# Patient Record
Sex: FEMALE | Employment: PART TIME | URBAN - METROPOLITAN AREA
[De-identification: names, ages, dates, MRNs, and addresses within clinical notes are randomized per-mention and may not be internally consistent; named-entity substitution may affect disease eponyms.]

---

## 2020-03-11 ENCOUNTER — OFFICE VISIT (OUTPATIENT)
Dept: URGENT CARE | Facility: CLINIC | Age: 50
End: 2020-03-11

## 2020-03-11 ENCOUNTER — HOSPITAL ENCOUNTER (OUTPATIENT)
Facility: MEDICAL CENTER | Age: 50
End: 2020-03-11
Attending: NURSE PRACTITIONER
Payer: COMMERCIAL

## 2020-03-11 ENCOUNTER — NON-PROVIDER VISIT (OUTPATIENT)
Dept: URGENT CARE | Facility: CLINIC | Age: 50
End: 2020-03-11

## 2020-03-11 VITALS
HEIGHT: 66 IN | BODY MASS INDEX: 27.48 KG/M2 | WEIGHT: 171 LBS | HEART RATE: 68 BPM | RESPIRATION RATE: 14 BRPM | TEMPERATURE: 97.8 F

## 2020-03-11 VITALS
OXYGEN SATURATION: 94 % | BODY MASS INDEX: 27.64 KG/M2 | RESPIRATION RATE: 14 BRPM | WEIGHT: 171.96 LBS | HEIGHT: 66 IN | HEART RATE: 68 BPM | TEMPERATURE: 97.8 F

## 2020-03-11 DIAGNOSIS — Z02.89 ENCOUNTER FOR OCCUPATIONAL HEALTH EXAMINATION: Primary | ICD-10-CM

## 2020-03-11 DIAGNOSIS — Z02.89 ENCOUNTER FOR OCCUPATIONAL HEALTH EXAMINATION: ICD-10-CM

## 2020-03-11 DIAGNOSIS — Z02.1 PRE-EMPLOYMENT DRUG SCREENING: ICD-10-CM

## 2020-03-11 LAB
AMP AMPHETAMINE: NORMAL
BAR BARBITURATES: NORMAL
BZO BENZODIAZEPINES: NORMAL
COC COCAINE: NORMAL
INT CON NEG: NORMAL
INT CON POS: NORMAL
MDMA ECSTASY: NORMAL
MET METHAMPHETAMINES: NORMAL
MTD METHADONE: NORMAL
OPI OPIATES: NORMAL
OXY OXYCODONE: NORMAL
PCP PHENCYCLIDINE: NORMAL
POC URINE DRUG SCREEN OCDRS: NORMAL
THC: NORMAL

## 2020-03-11 PROCEDURE — 80305 DRUG TEST PRSMV DIR OPT OBS: CPT | Mod: QW | Performed by: NURSE PRACTITIONER

## 2020-03-11 PROCEDURE — 86765 RUBEOLA ANTIBODY: CPT | Performed by: NURSE PRACTITIONER

## 2020-03-11 PROCEDURE — 86762 RUBELLA ANTIBODY: CPT | Performed by: NURSE PRACTITIONER

## 2020-03-11 PROCEDURE — 8915 PR COMPREHENSIVE PHYSICAL: Performed by: PREVENTIVE MEDICINE

## 2020-03-11 PROCEDURE — 86735 MUMPS ANTIBODY: CPT | Performed by: NURSE PRACTITIONER

## 2020-03-11 PROCEDURE — 86480 TB TEST CELL IMMUN MEASURE: CPT | Performed by: NURSE PRACTITIONER

## 2020-03-11 PROCEDURE — 90715 TDAP VACCINE 7 YRS/> IM: CPT | Performed by: NURSE PRACTITIONER

## 2020-03-11 PROCEDURE — 86787 VARICELLA-ZOSTER ANTIBODY: CPT | Performed by: NURSE PRACTITIONER

## 2020-03-11 PROCEDURE — 90686 IIV4 VACC NO PRSV 0.5 ML IM: CPT | Performed by: NURSE PRACTITIONER

## 2020-03-12 LAB
MEV IGG SER IA-ACNC: 0.38
MUV IGG SER IA-ACNC: 1.31
RUBV AB SER QL: 58.6 IU/ML
VZV IGG SER IA-ACNC: 3.16

## 2020-03-13 LAB
GAMMA INTERFERON BACKGROUND BLD IA-ACNC: 0.02 IU/ML
M TB IFN-G BLD-IMP: NEGATIVE
M TB IFN-G CD4+ BCKGRND COR BLD-ACNC: 0 IU/ML
MITOGEN IGNF BCKGRD COR BLD-ACNC: 3.2 IU/ML
QFT TB2 - NIL TBQ2: 0 IU/ML

## 2020-03-20 ENCOUNTER — TELEPHONE (OUTPATIENT)
Dept: URGENT CARE | Facility: CLINIC | Age: 50
End: 2020-03-20

## 2020-03-20 NOTE — TELEPHONE ENCOUNTER
Phone Number Called: [phone] (home)       Call outcome: Spoke to patient regarding message below.    Message: MMR Vaccine required. Low titers.

## 2020-03-24 ENCOUNTER — NON-PROVIDER VISIT (OUTPATIENT)
Dept: URGENT CARE | Facility: CLINIC | Age: 50
End: 2020-03-24

## 2020-03-24 DIAGNOSIS — Z23 NEED FOR VACCINATION: ICD-10-CM

## 2020-03-24 PROCEDURE — 90707 MMR VACCINE SC: CPT | Performed by: NURSE PRACTITIONER

## 2020-03-27 ENCOUNTER — EH NON-PROVIDER (OUTPATIENT)
Dept: OCCUPATIONAL MEDICINE | Facility: CLINIC | Age: 50
End: 2020-03-27

## 2020-03-27 DIAGNOSIS — Z71.85 IMMUNIZATION COUNSELING: ICD-10-CM

## 2020-04-24 ENCOUNTER — NON-PROVIDER VISIT (OUTPATIENT)
Dept: URGENT CARE | Facility: CLINIC | Age: 50
End: 2020-04-24

## 2020-04-24 DIAGNOSIS — Z23 NEED FOR VACCINATION: Primary | ICD-10-CM

## 2020-04-24 PROCEDURE — 90707 MMR VACCINE SC: CPT | Performed by: PREVENTIVE MEDICINE

## 2020-07-07 ENCOUNTER — HOSPITAL ENCOUNTER (OUTPATIENT)
Facility: MEDICAL CENTER | Age: 50
End: 2020-07-07
Attending: NURSE PRACTITIONER

## 2020-07-07 ENCOUNTER — OFFICE VISIT (OUTPATIENT)
Dept: MEDICAL GROUP | Facility: PHYSICIAN GROUP | Age: 50
End: 2020-07-07

## 2020-07-07 VITALS
BODY MASS INDEX: 27.16 KG/M2 | SYSTOLIC BLOOD PRESSURE: 130 MMHG | HEIGHT: 66 IN | TEMPERATURE: 98.6 F | OXYGEN SATURATION: 98 % | WEIGHT: 169 LBS | HEART RATE: 86 BPM | DIASTOLIC BLOOD PRESSURE: 82 MMHG

## 2020-07-07 DIAGNOSIS — Z86.19 HISTORY OF CLOSTRIDIOIDES DIFFICILE INFECTION: ICD-10-CM

## 2020-07-07 DIAGNOSIS — F41.9 ANXIETY: ICD-10-CM

## 2020-07-07 DIAGNOSIS — F33.41 RECURRENT MAJOR DEPRESSIVE DISORDER, IN PARTIAL REMISSION (HCC): ICD-10-CM

## 2020-07-07 DIAGNOSIS — G47.00 INSOMNIA, UNSPECIFIED TYPE: ICD-10-CM

## 2020-07-07 DIAGNOSIS — Z79.899 CHRONIC PRESCRIPTION BENZODIAZEPINE USE: ICD-10-CM

## 2020-07-07 PROCEDURE — 80307 DRUG TEST PRSMV CHEM ANLYZR: CPT

## 2020-07-07 PROCEDURE — 99214 OFFICE O/P EST MOD 30 MIN: CPT | Performed by: NURSE PRACTITIONER

## 2020-07-07 RX ORDER — ZOLPIDEM TARTRATE 12.5 MG/1
12.5 TABLET, FILM COATED, EXTENDED RELEASE ORAL NIGHTLY PRN
COMMUNITY
End: 2020-07-07 | Stop reason: SDUPTHER

## 2020-07-07 RX ORDER — ALPRAZOLAM 0.5 MG/1
0.5 TABLET ORAL 2 TIMES DAILY PRN
Qty: 30 TAB | Refills: 2 | Status: SHIPPED | OUTPATIENT
Start: 2020-07-07 | End: 2020-10-08 | Stop reason: SDUPTHER

## 2020-07-07 RX ORDER — ALPRAZOLAM 0.5 MG/1
0.5 TABLET ORAL NIGHTLY PRN
COMMUNITY
End: 2020-07-07 | Stop reason: SDUPTHER

## 2020-07-07 RX ORDER — ZOLPIDEM TARTRATE 12.5 MG/1
12.5 TABLET, FILM COATED, EXTENDED RELEASE ORAL NIGHTLY PRN
Qty: 30 TAB | Refills: 2 | Status: SHIPPED | OUTPATIENT
Start: 2020-07-07 | End: 2020-10-08 | Stop reason: SDUPTHER

## 2020-07-07 SDOH — HEALTH STABILITY: MENTAL HEALTH: HOW MANY STANDARD DRINKS CONTAINING ALCOHOL DO YOU HAVE ON A TYPICAL DAY?: 1 OR 2

## 2020-07-07 SDOH — HEALTH STABILITY: MENTAL HEALTH: HOW OFTEN DO YOU HAVE 6 OR MORE DRINKS ON ONE OCCASION?: NEVER

## 2020-07-07 SDOH — HEALTH STABILITY: MENTAL HEALTH: HOW OFTEN DO YOU HAVE A DRINK CONTAINING ALCOHOL?: MONTHLY OR LESS

## 2020-07-07 ASSESSMENT — PATIENT HEALTH QUESTIONNAIRE - PHQ9
5. POOR APPETITE OR OVEREATING: 1 - SEVERAL DAYS
SUM OF ALL RESPONSES TO PHQ QUESTIONS 1-9: 10
CLINICAL INTERPRETATION OF PHQ2 SCORE: 3

## 2020-07-07 NOTE — ASSESSMENT & PLAN NOTE
Patient is a 50-year-old female here to establish care with me today.  Reports anxiety and depression has been a chronic intermittent health problem for many years.  Has been taking sertraline 50 mg daily for last 2 years.  Has been running out of medication, so has been taking it every 2 to 3 days for the last 6 weeks.  Has noticed worsening anxiety and depression.  Patient also takes alprazolam 0.5 mg half a tab at a time, usually once every couple days.  Has been needing to take more since has been running out of sertraline.  Started to need alprazolam about a year ago after her son was in a MVA where his girlfriend was killed.  Patient has had a lot of stressors.  Moved to the area from [place] 5 months ago just before COVID pandemic.  Patient and her  had jobs lined up, but never started due to the pandemic.  They have recently found new jobs in the last few weeks.  Denies SI/HI.

## 2020-07-08 NOTE — ASSESSMENT & PLAN NOTE
Patient is a 50-year-old female here to establish care with me today.  Reports history of C. difficile a year and a half ago after 2 bouts of antibiotics for ear infection and tooth infection.  Had to have fecal transplant.  Denies any concerns.

## 2020-07-08 NOTE — PROGRESS NOTES
CC: Establish care, medication refills.    HISTORY OF THE PRESENT ILLNESS: Patient is a 50 y.o. female. This pleasant patient is here today establish care and for evaluation and management of the following health problems.  Patient recently moved to the area from [facility].  She brings in pill bottles that she is needing refills of.      Recurrent major depressive disorder, in partial remission (HCC)  Patient is a 50-year-old female here to establish care with me today.  Reports anxiety and depression has been a chronic intermittent health problem for many years.  Has been taking sertraline 50 mg daily for last 2 years.  Has been running out of medication, so has been taking it every 2 to 3 days for the last 6 weeks.  Has noticed worsening anxiety and depression.  Patient also takes alprazolam 0.5 mg half a tab at a time, usually once every couple days.  Has been needing to take more since has been running out of sertraline.  Started to need alprazolam about a year ago after her son was in a MVA where his girlfriend was killed.  Patient has had a lot of stressors.  Moved to the area from [place] 5 months ago just before COVID pandemic.  Patient and her  had jobs lined up, but never started due to the pandemic.  They have recently found new jobs in the last few weeks.  Denies SI/HI.    Insomnia  Patient is a 50-year-old female here to establish care with me today.  Patient reports this is been a chronic problem for a couple years.  Is usually well controlled with zolpidem CR 12.5 mg.  Helps keep patient asleep.  Denies sleepwalking or sleep eating.  Feels rested the next day.  Patient will be out of medication soon.  Has been using sparingly.  Reports on nights that she does not take it, she takes over-the-counter sleep aid which does not help.  Has recently started a new job as a  at [employer].  Needing to get good night sleep.    History of Clostridioides difficile  infection  Patient is a 50-year-old female here to establish care with me today.  Reports history of C. difficile a year and a half ago after 2 bouts of antibiotics for ear infection and tooth infection.  Had to have fecal transplant.  Denies any concerns.      Allergies: Sulfa drugs    Current Outpatient Medications Ordered in Epic   Medication Sig Dispense Refill   • Estradiol (DIVIGEL TD) Apply  to skin as directed.     • sertraline (ZOLOFT) 50 MG Tab Take 1 tab daily once a day for 2 weeks, then increase to 2 tabs once a day thereafter. 60 Tab 2   • zolpidem (AMBIEN CR) 12.5 MG CR tablet Take 1 Tab by mouth at bedtime as needed for Sleep for up to 90 days. 30 Tab 2   • ALPRAZolam (XANAX) 0.5 MG Tab Take 1 Tab by mouth 2 times a day as needed for Anxiety for up to 90 days. 30 Tab 2     No current Epic-ordered facility-administered medications on file.        Past Medical History:   Diagnosis Date   • Allergy    • Anxiety    • Depression        Past Surgical History:   Procedure Laterality Date   • ABDOMINAL HYSTERECTOMY TOTAL      ovaries removed   • LAPAROSCOPY     • PRIMARY C SECTION         Social History     Tobacco Use   • Smoking status: Former Smoker     Years: 2.00     Types: Cigarettes     Last attempt to quit:      Years since quittin.5   • Smokeless tobacco: Never Used   Substance Use Topics   • Alcohol use: Yes     Frequency: Monthly or less     Drinks per session: 1 or 2     Binge frequency: Never     Comment: Occ   • Drug use: Never       Family History   Problem Relation Age of Onset   • Arthritis Mother    • Cancer Father         bone cancer as child   • No Known Problems Sister    • No Known Problems Brother    • Heart Failure Maternal Grandmother        ROS:     - Constitutional: Negative for fever, chills, unexpected weight change, generalized weakness.  Positive fatigue.    - HEENT: Negative for headaches, vision changes, hearing changes, ear pain, rhinorrhea, sinus congestion, and  "sore throat.      - Respiratory: Negative for cough, dyspnea, and wheezing.      - Cardiovascular: Negative for chest pain, orthopnea, and bilateral lower extremity edema.  Positive palpitations when anxious.    - Gastrointestinal: Negative for heartburn, nausea, vomiting, abdominal pain, hematochezia, melena, diarrhea, constipation.     - Genitourinary: Negative for dysuria, polyuria, hematuria, pyuria, urinary urgency, and urinary incontinence.    - Musculoskeletal: Negative for myalgias, back pain, and joint pain.     - Skin: Negative for rash, itching, cyanotic skin color change.     - Neurological: Negative for tingling, tremors, focal sensory deficit, focal weakness and headaches.  Positive occasional dizziness.    - Endo/Heme/Allergies: Does not bruise/bleed easily.     - Psychiatric/Behavioral: As in HPI, otherwise negative for suicidal/homicidal ideation and memory loss.           Exam: /82   Pulse 86   Temp 37 °C (98.6 °F) (Temporal)   Ht 1.676 m (5' 6\")   Wt 76.7 kg (169 lb)   SpO2 98%  Body mass index is 27.28 kg/m².    General: Alert, pleasant, well nourished, well developed female in NAD  HEENT: Normocephalic. Eyes conjunctiva clear lids without ptosis, pupils equal and reactive to light, ears normal shape and contour, canals are clear bilaterally, tympanic membranes are pearly gray with good light reflex, nasal mucosa without erythema and drainage, oropharynx is without erythema, edema or exudates.   Neck: Supple without bruit. Thyroid is not enlarged.  Pulmonary: Clear to ausculation.  Normal effort. No rales, ronchi, or wheezing.  Cardiovascular: Normal rate and rhythm without murmur. Carotid and radial pulses are intact and equal bilaterally.  No lower extremity edema.  Abdomen: Soft, nontender, nondistended. Normal bowel sounds. Liver and spleen are not palpable  Neurologic: Grossly nonfocal  Lymph: No cervical or supraclavicular lymph nodes are palpable  Skin: Warm and dry.  No obvious " lesions.  Musculoskeletal: Normal gait.   Psych: Normal mood and affect.  Tearful when talking about son's motor vehicle accident.  Alert and oriented. Judgment and insight is normal.    Please note that this dictation was created using voice recognition software. I have made every reasonable attempt to correct obvious errors, but I expect that there are errors of grammar and possibly content that I did not discover before finalizing the note.      Assessment/Plan  1. Anxiety  Patient having worsening anxiety and depression.  Has only been taking Zoloft every other day for 6 weeks.  Will start Zoloft 50 mg daily for 2 weeks and increase to 100 mg thereafter.  Patient using alprazolam sparingly.   shows prescription for 60 tabs filled on 3/26/2020.  Reviewed risks of benzodiazepines including respiratory depression and that risks are increased with alcohol use.  Patient instructed not to take within 4 hours of taking Ambien.  Reviewed lifestyle measures including routine aerobic exercise as tolerated.  Will get controlled substance agreement and consent in addition to UDS.  Will get previous primary care providers chart notes.  - sertraline (ZOLOFT) 50 MG Tab; Take 1 tab daily once a day for 2 weeks, then increase to 2 tabs once a day thereafter.  Dispense: 60 Tab; Refill: 2  - ALPRAZolam (XANAX) 0.5 MG Tab; Take 1 Tab by mouth 2 times a day as needed for Anxiety for up to 90 days.  Dispense: 30 Tab; Refill: 2    2. Recurrent major depressive disorder, in partial remission (HCC)  As above, under anxiety.  - sertraline (ZOLOFT) 50 MG Tab; Take 1 tab daily once a day for 2 weeks, then increase to 2 tabs once a day thereafter.  Dispense: 60 Tab; Refill: 2    3. Chronic prescription benzodiazepine use  Patient understands that refills for controlled substances will be done at appointment only.  This patient is continuing to use a controlled substance (CS) on a long term basis.  The patient is thoroughly aware of the  goals of treatment with the CS  The patient is aware that yearly and random urine drug screens are required.  The patient has been instructed to take the CS only as prescribed.  The patient is prohibited from sharing the CS with any other person.  The patient is instructed to inform the provider if any other CS is taken, of any alcohol or cannabis or other recreational drug use, any treatment for side effects of the CS or complications, if they have CS active rx in other states  The patient has evidence for a reason for the CS  The treatment plan has been discussed with the patient  The  report has been reviewed      - Controlled Substance Treatment Agreement  - Consent for Opiate Prescription  - URINE DRUG SCREEN; Future    4. Insomnia, unspecified type  Patient brings in empty pill bottle today.  Requesting refill.   reviewed.  Will refill Ambien.  Did review good sleep hygiene.  - zolpidem (AMBIEN CR) 12.5 MG CR tablet; Take 1 Tab by mouth at bedtime as needed for Sleep for up to 90 days.  Dispense: 30 Tab; Refill: 2    5. History of Clostridioides difficile infection      Patient will return to clinic in 3 months for medication refill or sooner if needed.

## 2020-07-08 NOTE — ASSESSMENT & PLAN NOTE
Patient is a 50-year-old female here to establish care with me today.  Patient reports this is been a chronic problem for a couple years.  Is usually well controlled with zolpidem CR 12.5 mg.  Helps keep patient asleep.  Denies sleepwalking or sleep eating.  Feels rested the next day.  Patient will be out of medication soon.  Has been using sparingly.  Reports on nights that she does not take it, she takes over-the-counter sleep aid which does not help.  Has recently started a new job as a  at [employer].  Needing to get good night sleep.

## 2020-07-11 LAB
AMPHETAMINES UR QL: NEGATIVE NG/ML
BARBITURATES UR QL: NEGATIVE NG/ML
BENZODIAZ UR QL: NEGATIVE NG/ML
CANNABINOIDS UR QL SCN: NEGATIVE NG/ML
COCAINE UR QL: NEGATIVE NG/ML
DRUG SCREEN COMMENT UR-IMP: NORMAL
MDMA CTO UR SCN-MCNC: NEGATIVE NG/ML
METHADONE UR QL: NEGATIVE NG/ML
OPIATES UR QL: NEGATIVE NG/ML
OXYCODONE CTO UR SCN-MCNC: NEGATIVE NG/ML
PCP UR QL SCN: NEGATIVE NG/ML
PROPOXYPH UR QL: NEGATIVE NG/ML

## 2020-10-08 ENCOUNTER — OFFICE VISIT (OUTPATIENT)
Dept: MEDICAL GROUP | Facility: PHYSICIAN GROUP | Age: 50
End: 2020-10-08

## 2020-10-08 VITALS
TEMPERATURE: 97.5 F | SYSTOLIC BLOOD PRESSURE: 124 MMHG | DIASTOLIC BLOOD PRESSURE: 78 MMHG | BODY MASS INDEX: 27.32 KG/M2 | HEART RATE: 69 BPM | WEIGHT: 170 LBS | HEIGHT: 66 IN | OXYGEN SATURATION: 98 % | RESPIRATION RATE: 12 BRPM

## 2020-10-08 DIAGNOSIS — F41.9 ANXIETY: ICD-10-CM

## 2020-10-08 DIAGNOSIS — Z90.721 S/P HYSTERECTOMY WITH OOPHORECTOMY: ICD-10-CM

## 2020-10-08 DIAGNOSIS — F33.41 RECURRENT MAJOR DEPRESSIVE DISORDER, IN PARTIAL REMISSION (HCC): ICD-10-CM

## 2020-10-08 DIAGNOSIS — Z90.710 S/P HYSTERECTOMY WITH OOPHORECTOMY: ICD-10-CM

## 2020-10-08 DIAGNOSIS — G47.00 INSOMNIA, UNSPECIFIED TYPE: ICD-10-CM

## 2020-10-08 PROCEDURE — 99214 OFFICE O/P EST MOD 30 MIN: CPT | Performed by: NURSE PRACTITIONER

## 2020-10-08 RX ORDER — ZOLPIDEM TARTRATE 12.5 MG/1
12.5 TABLET, FILM COATED, EXTENDED RELEASE ORAL NIGHTLY PRN
Qty: 30 TAB | Refills: 2 | Status: SHIPPED | OUTPATIENT
Start: 2020-10-08 | End: 2021-01-06 | Stop reason: SDUPTHER

## 2020-10-08 RX ORDER — HYDROXYZINE HYDROCHLORIDE 25 MG/1
25 TABLET, FILM COATED ORAL 3 TIMES DAILY PRN
Qty: 60 TAB | Refills: 1 | Status: SHIPPED | OUTPATIENT
Start: 2020-10-08 | End: 2021-01-06

## 2020-10-08 RX ORDER — ALPRAZOLAM 0.5 MG/1
0.5 TABLET ORAL 2 TIMES DAILY PRN
Qty: 30 TAB | Refills: 2 | Status: SHIPPED | OUTPATIENT
Start: 2020-10-08 | End: 2021-01-06 | Stop reason: SDUPTHER

## 2020-10-08 NOTE — ASSESSMENT & PLAN NOTE
Patient is status post hysterectomy and oophorectomy in 2011 and 2016.  Has been on HRT since 2016.  Gynecology has been prescribing estradiol patch 0.1 mg every 7 days.  Patient is asking that I refill medication as her gynecologist is in [place] and patient has no longer under care.  Did discuss increase risk for breast cancer.

## 2020-10-08 NOTE — ASSESSMENT & PLAN NOTE
"Chronic health problem, improved control with sertraline 100 mg.  Patient is taking 50 mg twice a day.  She tried to take 100 mg once a day, but \"just felt off.\"  She reports great improvement in depression.  Still having anxiety.  Will need to take alprazolam half a tab twice a day.  Seems to take it in the morning routinely, then usually once a day for anxiety attack.  Reports wife has been less stressful.   now has a job as a , though he is away for weeks at a time.  He recently got his first paycheck.  She now has an established job at [email or web address].  She is still waiting to get health insurance as it is very expensive.  She does plan on getting health insurance before her next appointment with me.  At that time consider referral to psychiatry.    "

## 2020-10-08 NOTE — ASSESSMENT & PLAN NOTE
This is a chronic health problem that is well controlled with current medications and lifestyle measures.  Taking zolpidem CR 12.5 mg nightly.  Reports since restarting medication, has been sleeping much better.  Feels rested the next day.  Does not take within 4 hours of Xanax.  Requesting refill today.

## 2021-01-06 ENCOUNTER — OFFICE VISIT (OUTPATIENT)
Dept: MEDICAL GROUP | Facility: PHYSICIAN GROUP | Age: 51
End: 2021-01-06
Payer: COMMERCIAL

## 2021-01-06 VITALS
HEIGHT: 66 IN | DIASTOLIC BLOOD PRESSURE: 70 MMHG | BODY MASS INDEX: 27.48 KG/M2 | RESPIRATION RATE: 18 BRPM | HEART RATE: 73 BPM | OXYGEN SATURATION: 97 % | WEIGHT: 171 LBS | SYSTOLIC BLOOD PRESSURE: 110 MMHG | TEMPERATURE: 98.6 F

## 2021-01-06 DIAGNOSIS — G47.00 INSOMNIA, UNSPECIFIED TYPE: ICD-10-CM

## 2021-01-06 DIAGNOSIS — F33.41 RECURRENT MAJOR DEPRESSIVE DISORDER, IN PARTIAL REMISSION (HCC): ICD-10-CM

## 2021-01-06 DIAGNOSIS — Z13.29 SCREENING FOR THYROID DISORDER: ICD-10-CM

## 2021-01-06 DIAGNOSIS — Z90.721 S/P HYSTERECTOMY WITH OOPHORECTOMY: ICD-10-CM

## 2021-01-06 DIAGNOSIS — Z90.710 S/P HYSTERECTOMY WITH OOPHORECTOMY: ICD-10-CM

## 2021-01-06 DIAGNOSIS — Z13.6 SCREENING FOR CARDIOVASCULAR CONDITION: ICD-10-CM

## 2021-01-06 DIAGNOSIS — Z23 NEED FOR INFLUENZA VACCINATION: ICD-10-CM

## 2021-01-06 DIAGNOSIS — F41.9 ANXIETY: ICD-10-CM

## 2021-01-06 DIAGNOSIS — Z13.21 ENCOUNTER FOR VITAMIN DEFICIENCY SCREENING: ICD-10-CM

## 2021-01-06 DIAGNOSIS — Z12.31 ENCOUNTER FOR SCREENING MAMMOGRAM FOR BREAST CANCER: ICD-10-CM

## 2021-01-06 PROCEDURE — 90471 IMMUNIZATION ADMIN: CPT | Performed by: NURSE PRACTITIONER

## 2021-01-06 PROCEDURE — 99214 OFFICE O/P EST MOD 30 MIN: CPT | Mod: 25 | Performed by: NURSE PRACTITIONER

## 2021-01-06 PROCEDURE — 90686 IIV4 VACC NO PRSV 0.5 ML IM: CPT | Performed by: NURSE PRACTITIONER

## 2021-01-06 RX ORDER — ZOLPIDEM TARTRATE 12.5 MG/1
12.5 TABLET, FILM COATED, EXTENDED RELEASE ORAL NIGHTLY PRN
Qty: 30 TAB | Refills: 2 | Status: SHIPPED | OUTPATIENT
Start: 2021-01-06 | End: 2021-04-07 | Stop reason: SDUPTHER

## 2021-01-06 RX ORDER — ALPRAZOLAM 0.5 MG/1
0.5 TABLET ORAL 2 TIMES DAILY PRN
Qty: 30 TAB | Refills: 2 | Status: SHIPPED | OUTPATIENT
Start: 2021-01-06 | End: 2021-04-07 | Stop reason: SDUPTHER

## 2021-01-06 NOTE — PROGRESS NOTES
"CC: Medication refill    HISTORY OF THE PRESENT ILLNESS: Patient is a 50 y.o. female. This pleasant patient is here today for evaluation and management of the following health problems.        S/P hysterectomy with oophorectomy  Patient is status post hysterectomy and bilateral oophorectomy in 2011 and 2016.  She gradually discontinued estradiol patch.  Has not had to use it in over 6 weeks.  Denies any symptoms of hot flashes, irritability, vaginal dryness.  She now has insurance and will get routine screening mammogram.      Recurrent major depressive disorder, in partial remission (HCC)  This is a chronic health problem that is well controlled with current medications and lifestyle measures.  Taking sertraline 50 mg twice a day.  Taking 100 mg once a day because patient to \"feel out of it.\"  Life is not as stressful as it has been.  She is working at local grocery store as a .  Denies SI/HI.      Anxiety  This is a chronic health problem that is well controlled with current medications and lifestyle measures.  Taking alprazolam 0.5 mg one half tab twice a day.  She takes a half a tab in the morning routinely.  And needs a second half later in the day for anxiety.  Did trial hydroxyzine, which caused headache and sleepiness.  She discontinued taking it.  Also taking sertraline.  Patient now has insurance.  As we previously discussed, would like to refer her to psychiatry for further evaluation and treatment to assure that she is on the best medication plan for her diagnosis.  Patient is in agreement.    Insomnia  This is a chronic health problem that is well controlled with current medications and lifestyle measures.  Taking zolpidem CR 12.5 mg nightly.  Tolerating medication, denies sleep eating or sleep walking.  Feels rested the next day.  Sure not to take within 4 hours of taking alprazolam.  Requesting refill today.      Allergies: Sulfa drugs    Current Outpatient Medications Ordered in Epic " "  Medication Sig Dispense Refill   • zolpidem (AMBIEN CR) 12.5 MG CR tablet Take 1 Tab by mouth at bedtime as needed for Sleep for up to 90 days. 30 Tab 2   • ALPRAZolam (XANAX) 0.5 MG Tab Take 1 Tab by mouth 2 times a day as needed for Anxiety for up to 90 days. 30 Tab 2   • sertraline (ZOLOFT) 50 MG Tab Take 1 tab daily twice a day 180 Tab 0     No current Epic-ordered facility-administered medications on file.        Past Medical History:   Diagnosis Date   • Allergy    • Anxiety    • Depression        Past Surgical History:   Procedure Laterality Date   • ABDOMINAL HYSTERECTOMY TOTAL      ovaries removed   • LAPAROSCOPY     • PRIMARY C SECTION         Social History     Tobacco Use   • Smoking status: Former Smoker     Years: 2.00     Types: Cigarettes     Quit date:      Years since quittin.0   • Smokeless tobacco: Never Used   Substance Use Topics   • Alcohol use: Yes     Frequency: Monthly or less     Drinks per session: 1 or 2     Binge frequency: Never     Comment: Occ   • Drug use: Never       Family History   Problem Relation Age of Onset   • Arthritis Mother    • Cancer Father         bone cancer as child   • No Known Problems Sister    • No Known Problems Brother    • Heart Failure Maternal Grandmother        ROS:  As in HPI, otherwise negative for chest pain, dyspnea, abdominal pain, dysuria, blood in stool, fever         Exam: /70 (BP Location: Left arm, Patient Position: Sitting, BP Cuff Size: Adult)   Pulse 73   Temp 37 °C (98.6 °F) (Temporal)   Resp 18   Ht 1.676 m (5' 6\")   Wt 77.6 kg (171 lb)   SpO2 97%  Body mass index is 27.6 kg/m².    General: Alert, pleasant, well nourished, well developed female in NAD  Neck: Supple without bruit.   Pulmonary: Clear to ausculation.  Normal effort. No rales, ronchi, or wheezing.  Cardiovascular: Normal rate and rhythm without murmur.   Neurologic: Grossly nonfocal  Skin: Warm and dry.    Musculoskeletal: Normal gait.   Psych: Normal mood " and affect. Alert and oriented.  Rest appropriately for the weather.  Good eye contact.  Judgment and insight is normal.    Please note that this dictation was created using voice recognition software. I have made every reasonable attempt to correct obvious errors, but I expect that there are errors of grammar and possibly content that I did not discover before finalizing the note.      Assessment/Plan  1. Insomnia, unspecified type  Well-controlled.  Continue zolpidem.  Continue to work on good sleep hygiene, reviewed.  - zolpidem (AMBIEN CR) 12.5 MG CR tablet; Take 1 Tab by mouth at bedtime as needed for Sleep for up to 90 days.  Dispense: 30 Tab; Refill: 2  - REFERRAL TO PSYCHIATRY    2. Anxiety  Patient continues to need alprazolam daily.  Would like her to have evaluation by psychiatry, has not had evaluation in the past.  Will refer to psychiatry for further evaluation and treatment.  Patient is agreeable to plan.  Continue with sertraline 50 mg twice a day.  Reviewed lifestyle measures including routine aerobic exercise.  Discontinue hydroxyzine.    This patient is continuing to use a controlled substance (CS) on a long term basis.  The patient is thoroughly aware of the goals of treatment with the CS  The patient is aware that yearly and random urine drug screens are required.  The patient has been instructed to take the CS only as prescribed.  The patient is prohibited from sharing the CS with any other person.  The patient is instructed to inform the provider if any other CS is taken, of any alcohol or cannabis or other recreational drug use, any treatment for side effects of the CS or complications, if they have CS active rx in other states  The patient has evidence for a reason for the CS  The treatment plan has been discussed with the patient  The  report has been reviewed      - ALPRAZolam (XANAX) 0.5 MG Tab; Take 1 Tab by mouth 2 times a day as needed for Anxiety for up to 90 days.  Dispense: 30  Tab; Refill: 2  - sertraline (ZOLOFT) 50 MG Tab; Take 1 tab daily twice a day  Dispense: 180 Tab; Refill: 0  - REFERRAL TO PSYCHIATRY    3. S/P hysterectomy with oophorectomy  Patient has discontinued HRT.  Asymptomatic.  Continue to monitor.  Due for mammogram.    4. Screening for cardiovascular condition  We will notify patient of lab results.  - Comp Metabolic Panel; Future  - ESTIMATED GFR; Future  - Lipid Profile; Future  - CBC WITHOUT DIFFERENTIAL; Future    5. Screening for thyroid disorder  We will notify patient of lab results.  - TSH WITH REFLEX TO FT4; Future    6. Encounter for vitamin deficiency screening  We will notify patient of lab results.  - VITAMIN D,25 HYDROXY; Future    7. Recurrent major depressive disorder, in partial remission (HCC)  Well-controlled.  Continue with sertraline, no changes.  Will refer to psychiatry for depression, anxiety, insomnia.  Patient agreeable to plan.  - sertraline (ZOLOFT) 50 MG Tab; Take 1 tab daily twice a day  Dispense: 180 Tab; Refill: 0  - REFERRAL TO PSYCHIATRY    8. Encounter for screening mammogram for breast cancer  Ordered.  - MA-SCREENING MAMMO BILAT W/CAD; Future    9. Need for influenza vaccination  Given.  - Influenza Vaccine Quad Injection (PF)    Patient will return to clinic in 3 months for lab review and possibly medication refills if she has not established with psychiatry yet.

## 2021-01-06 NOTE — ASSESSMENT & PLAN NOTE
This is a chronic health problem that is well controlled with current medications and lifestyle measures.  Taking zolpidem CR 12.5 mg nightly.  Tolerating medication, denies sleep eating or sleep walking.  Feels rested the next day.  Sure not to take within 4 hours of taking alprazolam.  Requesting refill today.

## 2021-01-06 NOTE — ASSESSMENT & PLAN NOTE
Patient is status post hysterectomy and bilateral oophorectomy in 2011 and 2016.  She gradually discontinued estradiol patch.  Has not had to use it in over 6 weeks.  Denies any symptoms of hot flashes, irritability, vaginal dryness.  She now has insurance and will get routine screening mammogram.

## 2021-01-06 NOTE — ASSESSMENT & PLAN NOTE
This is a chronic health problem that is well controlled with current medications and lifestyle measures.  Taking alprazolam 0.5 mg one half tab twice a day.  She takes a half a tab in the morning routinely.  And needs a second half later in the day for anxiety.  Did trial hydroxyzine, which caused headache and sleepiness.  She discontinued taking it.  Also taking sertraline.  Patient now has insurance.  As we previously discussed, would like to refer her to psychiatry for further evaluation and treatment to assure that she is on the best medication plan for her diagnosis.  Patient is in agreement.

## 2021-01-06 NOTE — ASSESSMENT & PLAN NOTE
"This is a chronic health problem that is well controlled with current medications and lifestyle measures.  Taking sertraline 50 mg twice a day.  Taking 100 mg once a day because patient to \"feel out of it.\"  Life is not as stressful as it has been.  She is working at local grocery store as a .  Denies SI/HI.    "

## 2021-03-08 ENCOUNTER — HOSPITAL ENCOUNTER (OUTPATIENT)
Dept: LAB | Facility: MEDICAL CENTER | Age: 51
End: 2021-03-08
Attending: NURSE PRACTITIONER
Payer: COMMERCIAL

## 2021-03-08 DIAGNOSIS — Z13.6 SCREENING FOR CARDIOVASCULAR CONDITION: ICD-10-CM

## 2021-03-08 DIAGNOSIS — Z13.21 ENCOUNTER FOR VITAMIN DEFICIENCY SCREENING: ICD-10-CM

## 2021-03-08 DIAGNOSIS — Z13.29 SCREENING FOR THYROID DISORDER: ICD-10-CM

## 2021-03-08 LAB
25(OH)D3 SERPL-MCNC: 40 NG/ML (ref 30–100)
ALBUMIN SERPL BCP-MCNC: 4.2 G/DL (ref 3.2–4.9)
ALBUMIN/GLOB SERPL: 1.4 G/DL
ALP SERPL-CCNC: 86 U/L (ref 30–99)
ALT SERPL-CCNC: 25 U/L (ref 2–50)
ANION GAP SERPL CALC-SCNC: 10 MMOL/L (ref 7–16)
AST SERPL-CCNC: 26 U/L (ref 12–45)
BILIRUB SERPL-MCNC: 0.3 MG/DL (ref 0.1–1.5)
BUN SERPL-MCNC: 15 MG/DL (ref 8–22)
CALCIUM SERPL-MCNC: 10 MG/DL (ref 8.5–10.5)
CHLORIDE SERPL-SCNC: 104 MMOL/L (ref 96–112)
CHOLEST SERPL-MCNC: 227 MG/DL (ref 100–199)
CO2 SERPL-SCNC: 27 MMOL/L (ref 20–33)
CREAT SERPL-MCNC: 0.68 MG/DL (ref 0.5–1.4)
ERYTHROCYTE [DISTWIDTH] IN BLOOD BY AUTOMATED COUNT: 41.6 FL (ref 35.9–50)
GLOBULIN SER CALC-MCNC: 3.1 G/DL (ref 1.9–3.5)
GLUCOSE SERPL-MCNC: 84 MG/DL (ref 65–99)
HCT VFR BLD AUTO: 42.1 % (ref 37–47)
HDLC SERPL-MCNC: 73 MG/DL
HGB BLD-MCNC: 13.8 G/DL (ref 12–16)
LDLC SERPL CALC-MCNC: 144 MG/DL
MCH RBC QN AUTO: 30.5 PG (ref 27–33)
MCHC RBC AUTO-ENTMCNC: 32.8 G/DL (ref 33.6–35)
MCV RBC AUTO: 92.9 FL (ref 81.4–97.8)
PLATELET # BLD AUTO: 292 K/UL (ref 164–446)
PMV BLD AUTO: 9.9 FL (ref 9–12.9)
POTASSIUM SERPL-SCNC: 4 MMOL/L (ref 3.6–5.5)
PROT SERPL-MCNC: 7.3 G/DL (ref 6–8.2)
RBC # BLD AUTO: 4.53 M/UL (ref 4.2–5.4)
SODIUM SERPL-SCNC: 141 MMOL/L (ref 135–145)
TRIGL SERPL-MCNC: 50 MG/DL (ref 0–149)
TSH SERPL DL<=0.005 MIU/L-ACNC: 1.13 UIU/ML (ref 0.38–5.33)
WBC # BLD AUTO: 6.5 K/UL (ref 4.8–10.8)

## 2021-03-08 PROCEDURE — 80053 COMPREHEN METABOLIC PANEL: CPT

## 2021-03-08 PROCEDURE — 85027 COMPLETE CBC AUTOMATED: CPT

## 2021-03-08 PROCEDURE — 82306 VITAMIN D 25 HYDROXY: CPT

## 2021-03-08 PROCEDURE — 80061 LIPID PANEL: CPT

## 2021-03-08 PROCEDURE — 84443 ASSAY THYROID STIM HORMONE: CPT

## 2021-04-07 ENCOUNTER — OFFICE VISIT (OUTPATIENT)
Dept: MEDICAL GROUP | Facility: PHYSICIAN GROUP | Age: 51
End: 2021-04-07
Payer: COMMERCIAL

## 2021-04-07 VITALS
WEIGHT: 175 LBS | TEMPERATURE: 97.2 F | RESPIRATION RATE: 12 BRPM | OXYGEN SATURATION: 97 % | HEART RATE: 61 BPM | BODY MASS INDEX: 28.12 KG/M2 | SYSTOLIC BLOOD PRESSURE: 118 MMHG | HEIGHT: 66 IN | DIASTOLIC BLOOD PRESSURE: 72 MMHG

## 2021-04-07 DIAGNOSIS — Z13.6 SCREENING FOR CARDIOVASCULAR CONDITION: ICD-10-CM

## 2021-04-07 DIAGNOSIS — F41.9 ANXIETY: ICD-10-CM

## 2021-04-07 DIAGNOSIS — G47.00 INSOMNIA, UNSPECIFIED TYPE: ICD-10-CM

## 2021-04-07 DIAGNOSIS — E78.5 DYSLIPIDEMIA: ICD-10-CM

## 2021-04-07 DIAGNOSIS — F33.41 RECURRENT MAJOR DEPRESSIVE DISORDER, IN PARTIAL REMISSION (HCC): ICD-10-CM

## 2021-04-07 PROCEDURE — 99214 OFFICE O/P EST MOD 30 MIN: CPT | Performed by: NURSE PRACTITIONER

## 2021-04-07 RX ORDER — ALPRAZOLAM 0.5 MG/1
0.5 TABLET ORAL 2 TIMES DAILY PRN
Qty: 30 TABLET | Refills: 1 | Status: SHIPPED | OUTPATIENT
Start: 2021-04-07 | End: 2021-05-12

## 2021-04-07 RX ORDER — ZOLPIDEM TARTRATE 12.5 MG/1
12.5 TABLET, FILM COATED, EXTENDED RELEASE ORAL NIGHTLY PRN
Qty: 30 TABLET | Refills: 1 | Status: SHIPPED | OUTPATIENT
Start: 2021-04-07 | End: 2021-05-28 | Stop reason: SDUPTHER

## 2021-04-07 RX ORDER — ALPRAZOLAM 0.5 MG/1
0.5 TABLET ORAL PRN
COMMUNITY
End: 2021-04-07

## 2021-04-07 RX ORDER — ZOLPIDEM TARTRATE 12.5 MG/1
12.5 TABLET, FILM COATED, EXTENDED RELEASE ORAL NIGHTLY PRN
COMMUNITY
End: 2021-04-07

## 2021-04-07 ASSESSMENT — FIBROSIS 4 INDEX: FIB4 SCORE: 0.87

## 2021-04-07 NOTE — ASSESSMENT & PLAN NOTE
New finding.  ASCVD risk is 0.8%.  Patient not exercising.  Does not smoke.    Component      Latest Ref Rng & Units 3/8/2021          10:40 AM   Cholesterol,Tot      100 - 199 mg/dL 227 (H)   Triglycerides      0 - 149 mg/dL 50   HDL      >=40 mg/dL 73   LDL      <100 mg/dL 144 (H)

## 2021-04-07 NOTE — PROGRESS NOTES
CC: Anxiety, medication refill, lab review    HISTORY OF THE PRESENT ILLNESS: Patient is a 50 y.o. female. This pleasant patient is here today for evaluation and management of the following health problems.        Dyslipidemia  New finding.  ASCVD risk is 0.8%.  Patient not exercising.  Does not smoke.    Component      Latest Ref Rng & Units 3/8/2021          10:40 AM   Cholesterol,Tot      100 - 199 mg/dL 227 (H)   Triglycerides      0 - 149 mg/dL 50   HDL      >=40 mg/dL 73   LDL      <100 mg/dL 144 (H)         Anxiety  Chronic health problem, adequately controlled with current medications and lifestyle measures.  Taking sertraline 50 mg twice a day.  Also taking alprazolam 0.5 mg half a tab as needed, usually daily.  Stop taking alprazolam routinely in the morning.  Has noticed that she is more anxious later in the day at work.  Sometimes even anxious at home.  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  She feels constantly worried and occasionally panicky.  She has appointment to establish care with psychiatry next month.  UDS from 7/2020 consistent.   reviewed.      Recurrent major depressive disorder, in partial remission (HCC)  This is a chronic health problem that is well controlled with current medications and lifestyle measures.  Taking sertraline 50 mg twice a day.   is a long-.  He is home about once a month for a few days.  She knows this is just temporary for the next 6 months until he can get a short haul job and be home more often.  She is looking forward to the nicer weather so she can get out.  Denies SI/HI.  Has appointment to establish care with psychiatry next month.  Please see additional notes under anxiety.    Insomnia  This is a chronic health problem that is well controlled with current medications and lifestyle measures.  Taking zolpidem CR 12.5 mg nightly.  Denies adverse effects including sleep eating or sleepwalking.  Feels  "rested the next day.  Does not drink alcohol.  Patient is sure not to take within 4 hours of alprazolam.  Requesting refill today.  UDS from 2020 consistent.   reviewed.      Allergies: Sulfa drugs    Current Outpatient Medications Ordered in Epic   Medication Sig Dispense Refill   • zolpidem (AMBIEN CR) 12.5 MG CR tablet Take 1 tablet by mouth at bedtime as needed for Sleep for up to 60 days. 30 tablet 1   • ALPRAZolam (XANAX) 0.5 MG Tab Take 1 tablet by mouth 2 times a day as needed for Anxiety for up to 60 days. 30 tablet 1   • sertraline (ZOLOFT) 50 MG Tab Take 1 tab daily twice a day 180 tablet 0     No current Epic-ordered facility-administered medications on file.       Past Medical History:   Diagnosis Date   • Allergy    • Anxiety    • Depression        Past Surgical History:   Procedure Laterality Date   • ABDOMINAL HYSTERECTOMY TOTAL      ovaries removed   • LAPAROSCOPY     • PRIMARY C SECTION         Social History     Tobacco Use   • Smoking status: Former Smoker     Years: 2.00     Types: Cigarettes     Quit date:      Years since quittin.2   • Smokeless tobacco: Never Used   Substance Use Topics   • Alcohol use: Yes     Comment: Occ   • Drug use: Never       Family History   Problem Relation Age of Onset   • Arthritis Mother    • Cancer Father         bone cancer as child   • No Known Problems Sister    • No Known Problems Brother    • Heart Failure Maternal Grandmother        ROS:   As in HPI, otherwise negative for chest pain, dyspnea, abdominal pain, dysuria, blood in stool, fever             Exam: /72 (BP Location: Left arm, Patient Position: Sitting, BP Cuff Size: Adult)   Pulse 61   Temp 36.2 °C (97.2 °F) (Temporal)   Resp 12   Ht 1.676 m (5' 6\")   Wt 79.4 kg (175 lb)   SpO2 97%  Body mass index is 28.25 kg/m².    General: Alert, pleasant, well nourished, well developed female in NAD  HEENT: Normocephalic. Eyes conjunctiva clear lids without ptosis, pupils equal and " reactive to light, ears normal shape and contour, canals are clear bilaterally, tympanic membranes are pearly gray with good light reflex, nasal mucosa without erythema and drainage, oropharynx is without erythema, edema or exudates.   Neck: Supple without bruit. Thyroid is not enlarged.  Pulmonary: Clear to ausculation.  Normal effort. No rales, ronchi, or wheezing.  Cardiovascular: Normal rate and rhythm without murmur. Carotid and radial pulses are intact and equal bilaterally.  No lower extremity edema.  Abdomen: Soft, nontender, nondistended. Normal bowel sounds. Liver and spleen are not palpable  Neurologic: Grossly nonfocal  Lymph: No cervical or supraclavicular lymph nodes are palpable  Skin: Warm and dry.   Musculoskeletal: Normal gait.   Psych: Normal mood and affect. Alert and oriented. Judgment and insight is normal.    Component      Latest Ref Rng & Units 3/8/2021   Sodium      135 - 145 mmol/L 141   Potassium      3.6 - 5.5 mmol/L 4.0   Chloride      96 - 112 mmol/L 104   Co2      20 - 33 mmol/L 27   Anion Gap      7.0 - 16.0 10.0   Glucose      65 - 99 mg/dL 84   Bun      8 - 22 mg/dL 15   Creatinine      0.50 - 1.40 mg/dL 0.68   Calcium      8.5 - 10.5 mg/dL 10.0   AST(SGOT)      12 - 45 U/L 26   ALT(SGPT)      2 - 50 U/L 25   Alkaline Phosphatase      30 - 99 U/L 86   Total Bilirubin      0.1 - 1.5 mg/dL 0.3   Albumin      3.2 - 4.9 g/dL 4.2   Total Protein      6.0 - 8.2 g/dL 7.3   Globulin      1.9 - 3.5 g/dL 3.1   A-G Ratio      g/dL 1.4   WBC      4.8 - 10.8 K/uL 6.5   RBC      4.20 - 5.40 M/uL 4.53   Hemoglobin      12.0 - 16.0 g/dL 13.8   Hematocrit      37.0 - 47.0 % 42.1   MCV      81.4 - 97.8 fL 92.9   MCH      27.0 - 33.0 pg 30.5   MCHC      33.6 - 35.0 g/dL 32.8 (L)   RDW      35.9 - 50.0 fL 41.6   Platelet Count      164 - 446 K/uL 292   MPV      9.0 - 12.9 fL 9.9   GFR If African American      >60 mL/min/1.73 m 2 >60   GFR If Non African American      >60 mL/min/1.73 m 2 >60   TSH       0.380 - 5.330 uIU/mL 1.130   25-Hydroxy   Vitamin D 25      30 - 100 ng/mL 40       Please note that this dictation was created using voice recognition software. I have made every reasonable attempt to correct obvious errors, but I expect that there are errors of grammar and possibly content that I did not discover before finalizing the note.      Assessment/Plan  1. Insomnia, unspecified type  Well-controlled.  Continue zolpidem.  Continue with good sleep hygiene.  Will not take zolpidem within 4 hours of taking alprazolam.   reviewed.  UDS consistent  - zolpidem (AMBIEN CR) 12.5 MG CR tablet; Take 1 tablet by mouth at bedtime as needed for Sleep for up to 60 days.  Dispense: 30 tablet; Refill: 1    2. Anxiety  Moderately controlled.  Continue with sertraline, no changes.  We will continue with alprazolam.  Will start taking routinely in the morning again.   reviewed.  UDS consistent.  Again reviewed risks of benzodiazepines including addiction and respiratory depression.  Patient will keep appointment to establish care with psychiatry for further evaluation and treatment including medication management.  - ALPRAZolam (XANAX) 0.5 MG Tab; Take 1 tablet by mouth 2 times a day as needed for Anxiety for up to 60 days.  Dispense: 30 tablet; Refill: 1  - sertraline (ZOLOFT) 50 MG Tab; Take 1 tab daily twice a day  Dispense: 180 tablet; Refill: 0    3. Recurrent major depressive disorder, in partial remission (HCC)  Well-controlled.  Continue sertraline.  Patient will make an effort to get outside and exercise.  - sertraline (ZOLOFT) 50 MG Tab; Take 1 tab daily twice a day  Dispense: 180 tablet; Refill: 0    4. Dyslipidemia  Reviewed ASCVD risk.  Reviewed lifestyle measures including routine aerobic exercise, weight loss, low-fat diet.  Handout given to patient today.  - Lipid Profile; Future    5. Screening for cardiovascular condition  We will review labs in a year.  - Comp Metabolic Panel; Future  - ESTIMATED  GFR; Future    Patient will return to clinic annually or sooner if needed.

## 2021-04-07 NOTE — ASSESSMENT & PLAN NOTE
This is a chronic health problem that is well controlled with current medications and lifestyle measures.  Taking sertraline 50 mg twice a day.   is a long-.  He is home about once a month for a few days.  She knows this is just temporary for the next 6 months until he can get a short haul job and be home more often.  She is looking forward to the nicer weather so she can get out.  Denies SI/HI.  Has appointment to establish care with psychiatry next month.  Please see additional notes under anxiety.

## 2021-04-07 NOTE — PATIENT INSTRUCTIONS

## 2021-04-07 NOTE — ASSESSMENT & PLAN NOTE
This is a chronic health problem that is well controlled with current medications and lifestyle measures.  Taking zolpidem CR 12.5 mg nightly.  Denies adverse effects including sleep eating or sleepwalking.  Feels rested the next day.  Does not drink alcohol.  Patient is sure not to take within 4 hours of alprazolam.  Requesting refill today.  UDS from 7/2020 consistent.   reviewed.

## 2021-04-07 NOTE — ASSESSMENT & PLAN NOTE
Chronic health problem, adequately controlled with current medications and lifestyle measures.  Taking sertraline 50 mg twice a day.  Also taking alprazolam 0.5 mg half a tab as needed, usually daily.  Stop taking alprazolam routinely in the morning.  Has noticed that she is more anxious later in the day at work.  Sometimes even anxious at home.  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  She feels constantly worried and occasionally panicky.  She has appointment to establish care with psychiatry next month.  UDS from 7/2020 consistent.   reviewed.

## 2021-04-19 ENCOUNTER — HOSPITAL ENCOUNTER (OUTPATIENT)
Dept: RADIOLOGY | Facility: MEDICAL CENTER | Age: 51
End: 2021-04-19
Attending: NURSE PRACTITIONER
Payer: COMMERCIAL

## 2021-04-19 DIAGNOSIS — Z12.31 ENCOUNTER FOR SCREENING MAMMOGRAM FOR BREAST CANCER: ICD-10-CM

## 2021-04-19 PROCEDURE — 77063 BREAST TOMOSYNTHESIS BI: CPT

## 2021-04-26 ENCOUNTER — OFFICE VISIT (OUTPATIENT)
Dept: URGENT CARE | Facility: PHYSICIAN GROUP | Age: 51
End: 2021-04-26
Payer: COMMERCIAL

## 2021-04-26 ENCOUNTER — HOSPITAL ENCOUNTER (OUTPATIENT)
Facility: MEDICAL CENTER | Age: 51
End: 2021-04-26
Attending: FAMILY MEDICINE
Payer: COMMERCIAL

## 2021-04-26 VITALS
TEMPERATURE: 97 F | DIASTOLIC BLOOD PRESSURE: 80 MMHG | OXYGEN SATURATION: 97 % | RESPIRATION RATE: 14 BRPM | SYSTOLIC BLOOD PRESSURE: 120 MMHG | HEIGHT: 66 IN | BODY MASS INDEX: 28.54 KG/M2 | HEART RATE: 61 BPM | WEIGHT: 177.6 LBS

## 2021-04-26 DIAGNOSIS — N39.0 ACUTE URINARY TRACT INFECTION: ICD-10-CM

## 2021-04-26 LAB
APPEARANCE UR: NORMAL
BILIRUB UR STRIP-MCNC: NORMAL MG/DL
COLOR UR AUTO: YELLOW
GLUCOSE UR STRIP.AUTO-MCNC: NORMAL MG/DL
KETONES UR STRIP.AUTO-MCNC: NORMAL MG/DL
LEUKOCYTE ESTERASE UR QL STRIP.AUTO: NORMAL
NITRITE UR QL STRIP.AUTO: NORMAL
PH UR STRIP.AUTO: 6.5 [PH] (ref 5–8)
PROT UR QL STRIP: NORMAL MG/DL
RBC UR QL AUTO: NORMAL
SP GR UR STRIP.AUTO: 1.03
UROBILINOGEN UR STRIP-MCNC: 0.2 MG/DL

## 2021-04-26 PROCEDURE — 87077 CULTURE AEROBIC IDENTIFY: CPT | Mod: 91

## 2021-04-26 PROCEDURE — 87186 SC STD MICRODIL/AGAR DIL: CPT

## 2021-04-26 PROCEDURE — 87086 URINE CULTURE/COLONY COUNT: CPT

## 2021-04-26 PROCEDURE — 99214 OFFICE O/P EST MOD 30 MIN: CPT | Performed by: FAMILY MEDICINE

## 2021-04-26 PROCEDURE — 81002 URINALYSIS NONAUTO W/O SCOPE: CPT | Performed by: FAMILY MEDICINE

## 2021-04-26 RX ORDER — NITROFURANTOIN 25; 75 MG/1; MG/1
100 CAPSULE ORAL EVERY 12 HOURS
Qty: 10 CAPSULE | Refills: 0 | Status: SHIPPED | OUTPATIENT
Start: 2021-04-26 | End: 2021-05-01

## 2021-04-26 ASSESSMENT — FIBROSIS 4 INDEX: FIB4 SCORE: 0.87

## 2021-04-26 NOTE — PROGRESS NOTES
Chief Complaint:    Chief Complaint   Patient presents with   • Painful Urination     onset 1 day       History of Present Illness:    Symptoms since yesterday. Has dysuria, urinary urgency, and discomfort in bladder area. Feels like typical UTI. Last UTI was many years ago. She took Azo last night. She has history of C. diff s/p fecal transplant. She thinks she may have had Macrobid in the past without problem.       Past Medical History:    Past Medical History:   Diagnosis Date   • Allergy    • Anxiety    • Depression      Past Surgical History:    Past Surgical History:   Procedure Laterality Date   • ABDOMINAL HYSTERECTOMY TOTAL      ovaries removed   • LAPAROSCOPY     • PRIMARY C SECTION       Social History:    Social History     Socioeconomic History   • Marital status: Single     Spouse name: Not on file   • Number of children: Not on file   • Years of education: Not on file   • Highest education level: Not on file   Occupational History   • Not on file   Tobacco Use   • Smoking status: Former Smoker     Years: 2.00     Types: Cigarettes     Quit date:      Years since quittin.3   • Smokeless tobacco: Never Used   Substance and Sexual Activity   • Alcohol use: Yes     Comment: Occ   • Drug use: Never   • Sexual activity: Yes     Partners: Male     Birth control/protection: Surgical   Other Topics Concern   • Not on file   Social History Narrative   • Not on file     Social Determinants of Health     Financial Resource Strain:    • Difficulty of Paying Living Expenses:    Food Insecurity:    • Worried About Running Out of Food in the Last Year:    • Ran Out of Food in the Last Year:    Transportation Needs:    • Lack of Transportation (Medical):    • Lack of Transportation (Non-Medical):    Physical Activity:    • Days of Exercise per Week:    • Minutes of Exercise per Session:    Stress:    • Feeling of Stress :    Social Connections:    • Frequency of Communication with Friends and Family:    •  "Frequency of Social Gatherings with Friends and Family:    • Attends Restoration Services:    • Active Member of Clubs or Organizations:    • Attends Club or Organization Meetings:    • Marital Status:    Intimate Partner Violence:    • Fear of Current or Ex-Partner:    • Emotionally Abused:    • Physically Abused:    • Sexually Abused:      Family History:    Family History   Problem Relation Age of Onset   • Arthritis Mother    • Cancer Father         bone cancer as child   • No Known Problems Sister    • No Known Problems Brother    • Heart Failure Maternal Grandmother      Medications:    Current Outpatient Medications on File Prior to Visit   Medication Sig Dispense Refill   • zolpidem (AMBIEN CR) 12.5 MG CR tablet Take 1 tablet by mouth at bedtime as needed for Sleep for up to 60 days. 30 tablet 1   • ALPRAZolam (XANAX) 0.5 MG Tab Take 1 tablet by mouth 2 times a day as needed for Anxiety for up to 60 days. 30 tablet 1   • sertraline (ZOLOFT) 50 MG Tab Take 1 tab daily twice a day 180 tablet 0     No current facility-administered medications on file prior to visit.     Allergies:    Allergies   Allergen Reactions   • Sulfa Drugs Rash     o       Vitals:    Vitals:    04/26/21 0916   BP: 120/80   Pulse: 61   Resp: 14   Temp: 36.1 °C (97 °F)   TempSrc: Temporal   SpO2: 97%   Weight: 80.6 kg (177 lb 9.6 oz)   Height: 1.676 m (5' 6\")       Physical Exam:    Constitutional: Vital signs reviewed. Appears well-developed and well-nourished. No acute distress.   Eyes: Sclera white, conjunctivae clear.   ENT: External ears normal. Hearing normal.  Neck: Neck supple.   Pulmonary/Chest: Respirations non-labored.   Abdomen: Bowel sounds are normal active. Soft, non-distended, and non-tender to palpation.    Musculoskeletal: No CVA TTP bilaterally. Normal gait. Normal range of motion. No muscular atrophy or weakness.  Neurological: Alert and oriented to person, place, and time. Muscle tone normal. Coordination normal. Light " touch and sensation normal.   Skin: No rashes or lesions. Warm, dry, normal turgor.  Psychiatric: Normal mood and affect. Behavior is normal. Judgment and thought content normal.       Diagnostics:    POCT Urinalysis  Order: [id number]  Status:  Final result   Visible to patient:  Katlyn (scheduled for 4/27/2021  7:56 AM) Next appt:  05/12/2021 at 02:00 PM in [facility] (Tressa Devlin M.D.) Dx:  Acute urinary tract infection   Ref Range & Units  9:29 AM   POC Color Negative yellow    POC Appearance Negative slightly cloudy    POC Leukocyte Esterase Negative small    POC Nitrites Negative neg    POC Urobiligen Negative (0.2) mg/dL 0.2    POC Protein Negative mg/dL neg    POC Urine PH 5.0 - 8.0 6.5    POC Blood Negative neg    POC Specific Gravity <1.005 - >1.030 1.030    POC Ketones Negative mg/dL neg    POC Bilirubin Negative mg/dL neg    POC Glucose Negative mg/dL neg    Resulting Agency  [facility]         Specimen Collected: 04/26/21  9:29 AM Last Resulted: 04/26/21  9:55 AM             Assessment / Plan:    1. Acute urinary tract infection  - POCT Urinalysis  - nitrofurantoin (MACROBID) 100 MG Cap; Take 1 capsule by mouth every 12 hours for 5 days.  Dispense: 10 capsule; Refill: 0  - URINE CULTURE(NEW); Future      Discussed with her DDX, management options, and risks, benefits, and alternatives to treatment plan agreed upon.    May continue with OTC Azo for symptoms as needed.    Agreeable to medication prescribed and send urine for culture.    Advised urine culture result will show in MyChart in a few days.    She will follow-up if needed while waiting for urine culture result.

## 2021-04-29 LAB
BACTERIA UR CULT: ABNORMAL
BACTERIA UR CULT: ABNORMAL
SIGNIFICANT IND 70042: ABNORMAL
SITE SITE: ABNORMAL
SOURCE SOURCE: ABNORMAL

## 2021-05-06 ENCOUNTER — OFFICE VISIT (OUTPATIENT)
Dept: URGENT CARE | Facility: PHYSICIAN GROUP | Age: 51
End: 2021-05-06
Payer: COMMERCIAL

## 2021-05-06 VITALS
BODY MASS INDEX: 28.12 KG/M2 | RESPIRATION RATE: 16 BRPM | WEIGHT: 175 LBS | HEIGHT: 66 IN | TEMPERATURE: 98.5 F | DIASTOLIC BLOOD PRESSURE: 86 MMHG | SYSTOLIC BLOOD PRESSURE: 124 MMHG | OXYGEN SATURATION: 95 % | HEART RATE: 80 BPM

## 2021-05-06 DIAGNOSIS — L27.0 ALLERGIC DRUG RASH: ICD-10-CM

## 2021-05-06 PROCEDURE — 99214 OFFICE O/P EST MOD 30 MIN: CPT | Mod: 25 | Performed by: NURSE PRACTITIONER

## 2021-05-06 RX ORDER — METHYLPREDNISOLONE SODIUM SUCCINATE 125 MG/2ML
62.5 INJECTION, POWDER, LYOPHILIZED, FOR SOLUTION INTRAMUSCULAR; INTRAVENOUS ONCE
Status: COMPLETED | OUTPATIENT
Start: 2021-05-06 | End: 2021-05-06

## 2021-05-06 RX ORDER — HYDROXYZINE HYDROCHLORIDE 25 MG/1
25 TABLET, FILM COATED ORAL 3 TIMES DAILY PRN
Qty: 21 TABLET | Refills: 0 | Status: SHIPPED | OUTPATIENT
Start: 2021-05-06 | End: 2021-05-13

## 2021-05-06 RX ORDER — METHYLPREDNISOLONE 4 MG/1
TABLET ORAL
Qty: 21 TABLET | Refills: 0 | Status: SHIPPED | OUTPATIENT
Start: 2021-05-06 | End: 2021-06-16

## 2021-05-06 RX ADMIN — METHYLPREDNISOLONE SODIUM SUCCINATE 62.5 MG: 125 INJECTION, POWDER, LYOPHILIZED, FOR SOLUTION INTRAMUSCULAR; INTRAVENOUS at 14:02

## 2021-05-06 ASSESSMENT — ENCOUNTER SYMPTOMS
COUGH: 0
SHORTNESS OF BREATH: 0
SORE THROAT: 0
FEVER: 0

## 2021-05-06 ASSESSMENT — FIBROSIS 4 INDEX: FIB4 SCORE: 0.87

## 2021-05-06 NOTE — PATIENT INSTRUCTIONS
Drug Rash    A drug rash occurs when a medicine causes a change in the color or texture of the skin. It can develop minutes, hours, or days after you take the medicine. The rash may appear on a small area of skin or all over your body.  What are the causes?  This condition is usually caused by your body's reaction (allergy) to a medicine. It can also be caused by exposure to sunlight after taking a medicine that makes your skin sensitive to light.  Though any medicine can cause a rash or reaction, medicines that are more likely to cause rashes include:  · Penicillin.  · Antibiotic medicines.  · Medicines that treat seizures.  · Medicines that treat cancer (chemotherapy).  · Aspirin and other NSAIDs.  · Injectable dyes that contain iodine.  · Insulin.  What are the signs or symptoms?  Symptoms of this condition include:  · Redness.  · Tiny bumps.  · Peeling.  · Itching.  · Itchy welts (hives).  · Swelling.  How is this diagnosed?  This condition may be diagnosed based on:  · A physical exam.  · Tests to find out which medicine caused the rash. These tests may include:  ? Skin tests.  ? Blood tests.  ? Challenge test. For this test, you stop taking all the medicines that you do not need to take. Then, you start taking them again by adding back one medicine at a time.  How is this treated?  This condition is treated with medicines, including:  · Antihistamine. This may be given to relieve itching.  · NSAIDs. These may be given to reduce swelling and to treat pain.  · A steroid medicine. This may be given to reduce swelling.  The rash usually goes away when you stop taking the medicine that caused it.  Follow these instructions at home:  · Take over-the-counter and prescription medicines only as told by your health care provider.  · Tell all your health care providers about any medicine reactions that you have had in the past.  · If your rash was caused by sensitivity to sunlight, and while your rash is  healing:  ? Avoid being in the sun if possible, especially when it is strongest, usually between 10 a.m. and 4 p.m.  ? Cover your skin with pants, long sleeves, and a hat when you are exposed to sunlight.  · If you have hives:  ? Take a cool shower or use a cool compress to relieve itchiness.  ? Take over-the-counter antihistamines, as recommended by your health care provider, until the hives are gone. Hives are not contagious.  · Keep all follow-up visits as told by your health care provider. This is important.  Contact a health care provider if you have:  · A fever.  · A rash that is not going away.  · A rash that gets worse.  · A rash that comes back.  · Wheezing or coughing.  Get help right away if:  · You start to have breathing problems.  · You start to have shortness of breath.  · Your face or throat starts to swell.  · You have severe weakness with dizziness or fainting.  · You have chest pain.  These symptoms may represent a serious problem that is an emergency. Do not wait to see if the symptoms will go away. Get medical help right away. Call your local emergency services (911 in the [place] Do not drive yourself to the hospital.  Summary  · A drug rash occurs when a medicine causes a change in the color or texture of the skin. The rash may appear on a small area of skin or all over your body.  · It can develop minutes, hours, or days after you take the medicine.  · Your health care provider will do various tests to determine what medicine caused your rash.  · The rash may be treated with medicine to relieve itching, swelling, and pain.  This information is not intended to replace advice given to you by your health care provider. Make sure you discuss any questions you have with your health care provider.  Document Released: 01/25/2006 Document Revised: 11/30/2018 Document Reviewed: 11/08/2018  Elsevier Patient Education © 2020 Else[employer] Inc[employer]      Hives  Hives (urticaria) are itchy, red, swollen areas on the  skin. Hives can appear on any part of the body. Hives often fade within 24 hours (acute hives). Sometimes, new hives appear after old ones fade and the cycle can continue for several days or weeks (chronic hives). Hives do not spread from person to person (are not contagious).  Hives come from the body's reaction to something a person is allergic to (allergen), something that causes irritation, or various other triggers. When a person is exposed to a trigger, his or her body releases a chemical (histamine) that causes redness, itching, and swelling. Hives can appear right after exposure to a trigger or hours later.  What are the causes?  This condition may be caused by:  · Allergies to foods or ingredients.  · Insect bites or stings.  · Exposure to pollen or pets.  · Contact with latex or chemicals.  · Spending time in sunlight, heat, or cold (exposure).  · Exercise.  · Stress.  · Certain medicines.  You can also get hives from other medical conditions and treatments, such as:  · Viruses, including the common cold.  · Bacterial infections, such as urinary tract infections and strep throat.  · Certain medicines.  · Allergy shots.  · Blood transfusions.  Sometimes, the cause of this condition is not known (idiopathic hives).  What increases the risk?  You are more likely to develop this condition if you:  · Are a woman.  · Have food allergies, especially to citrus fruits, milk, eggs, peanuts, tree nuts, or shellfish.  · Are allergic to:  ? Medicines.  ? Latex.  ? Insects.  ? Animals.  ? Pollen.  What are the signs or symptoms?  Common symptoms of this condition include raised, itchy, red or white bumps or patches on your skin. These areas may:  · Become large and swollen (welts).  · Change in shape and location, quickly and repeatedly.  · Be separate hives or connect over a large area of skin.  · Sting or become painful.  · Turn white when pressed in the center (sonia).  In severe cases, your hands, feet, and face  may also become swollen. This may occur if hives develop deeper in your skin.  How is this diagnosed?  This condition may be diagnosed by your symptoms, medical history, and physical exam.  · Your skin, urine, or blood may be tested to find out what is causing your hives and to rule out other health issues.  · Your health care provider may also remove a small sample of skin from the affected area and examine it under a microscope (biopsy).  How is this treated?  Treatment for this condition depends on the cause and severity of your symptoms. Your health care provider may recommend using cool, wet cloths (cool compresses) or taking cool showers to relieve itching. Treatment may include:  · Medicines that help:  ? Relieve itching (antihistamines).  ? Reduce swelling (corticosteroids).  ? Treat infection (antibiotics).  · An injectable medicine (omalizumab). Your health care provider may prescribe this if you have chronic idiopathic hives and you continue to have symptoms even after treatment with antihistamines.  Severe cases may require an emergency injection of adrenaline (epinephrine) to prevent a life-threatening allergic reaction (anaphylaxis).  Follow these instructions at home:  Medicines  · Take and apply over-the-counter and prescription medicines only as told by your health care provider.  · If you were prescribed an antibiotic medicine, take it as told by your health care provider. Do not stop using the antibiotic even if you start to feel better.  Skin care  · Apply cool compresses to the affected areas.  · Do not scratch or rub your skin.  General instructions  · Do not take hot showers or baths. This can make itching worse.  · Do not wear tight-fitting clothing.  · Use sunscreen and wear protective clothing when you are outside.  · Avoid any substances that cause your hives. Keep a journal to help track what causes your hives. Write down:  ? What medicines you take.  ? What you eat and drink.  ? What  products you use on your skin.  · Keep all follow-up visits as told by your health care provider. This is important.  Contact a health care provider if:  · Your symptoms are not controlled with medicine.  · Your joints are painful or swollen.  Get help right away if:  · You have a fever.  · You have pain in your abdomen.  · Your tongue or lips are swollen.  · Your eyelids are swollen.  · Your chest or throat feels tight.  · You have trouble breathing or swallowing.  These symptoms may represent a serious problem that is an emergency. Do not wait to see if the symptoms will go away. Get medical help right away. Call your local emergency services (911 in the [place] Do not drive yourself to the hospital.  Summary  · Hives (urticaria) are itchy, red, swollen areas on your skin. Hives come from the body's reaction to something a person is allergic to (allergen), something that causes irritation, or various other triggers.  · Treatment for this condition depends on the cause and severity of your symptoms.  · Avoid any substances that cause your hives. Keep a journal to help track what causes your hives.  · Take and apply over-the-counter and prescription medicines only as told by your health care provider.  · Keep all follow-up visits as told by your health care provider. This is important.  This information is not intended to replace advice given to you by your health care provider. Make sure you discuss any questions you have with your health care provider.  Document Released: 12/18/2006 Document Revised: 07/03/2019 Document Reviewed: 07/03/2019  Elsevier Patient Education © 2020 Elsevier Inc.

## 2021-05-06 NOTE — PROGRESS NOTES
Subjective:     Johanna Hunter is a 50 y.o. female who presents for Rash (x1day, all over body )      Rash yesterday started to arms. Now everywhere. Started  Macrobid, finished last Friday.  New body wash this week. Symptoms are similar to sulfa allergy in the past. Took Benadryl.     Rash  This is a new problem. The current episode started yesterday. The problem has been gradually worsening since onset. The rash is diffuse. The rash is characterized by redness and itchiness. She was exposed to a new detergent/soap and a new medication. Pertinent negatives include no cough, facial edema, fever, shortness of breath or sore throat. Past treatments include antihistamine. The treatment provided mild relief. Her past medical history is significant for allergies.       Past Medical History:   Diagnosis Date   • Allergy    • Anxiety    • Depression        Past Surgical History:   Procedure Laterality Date   • ABDOMINAL HYSTERECTOMY TOTAL      ovaries removed   • LAPAROSCOPY     • PRIMARY C SECTION         Social History     Socioeconomic History   • Marital status: Single     Spouse name: Not on file   • Number of children: Not on file   • Years of education: Not on file   • Highest education level: Not on file   Occupational History   • Not on file   Tobacco Use   • Smoking status: Former Smoker     Years: 2.00     Types: Cigarettes     Quit date:      Years since quittin.3   • Smokeless tobacco: Never Used   Substance and Sexual Activity   • Alcohol use: Yes     Comment: Occ   • Drug use: Never   • Sexual activity: Yes     Partners: Male     Birth control/protection: Surgical   Other Topics Concern   • Not on file   Social History Narrative   • Not on file     Social Determinants of Health     Financial Resource Strain:    • Difficulty of Paying Living Expenses:    Food Insecurity:    • Worried About Running Out of Food in the Last Year:    • Ran Out of Food in the Last Year:    Transportation Needs:    •  "Lack of Transportation (Medical):    • Lack of Transportation (Non-Medical):    Physical Activity:    • Days of Exercise per Week:    • Minutes of Exercise per Session:    Stress:    • Feeling of Stress :    Social Connections:    • Frequency of Communication with Friends and Family:    • Frequency of Social Gatherings with Friends and Family:    • Attends [employer] Services:    • Active Member of Clubs or Organizations:    • Attends Club or Organization Meetings:    • Marital Status:    Intimate Partner Violence:    • Fear of Current or Ex-Partner:    • Emotionally Abused:    • Physically Abused:    • Sexually Abused:         Family History   Problem Relation Age of Onset   • Arthritis Mother    • Cancer Father         bone cancer as child   • No Known Problems Sister    • No Known Problems Brother    • Heart Failure Maternal Grandmother         Allergies   Allergen Reactions   • Sulfa Drugs Rash     o   • Macrobid [Nitrofurantoin] Hives       Review of Systems   Constitutional: Negative for fever.   HENT: Negative for sore throat.    Respiratory: Negative for cough, shortness of breath, wheezing and stridor.    Skin: Positive for itching and rash.   All other systems reviewed and are negative.       Objective:   /86   Pulse 80   Temp 36.9 °C (98.5 °F) (Temporal)   Resp 16   Ht 1.676 m (5' 6\")   Wt 79.4 kg (175 lb)   SpO2 95%   BMI 28.25 kg/m²     Physical Exam  Vitals reviewed.   Constitutional:       General: She is not in acute distress.     Appearance: She is well-developed.   HENT:      Head: Normocephalic and atraumatic.      Right Ear: External ear normal.      Left Ear: External ear normal.      Nose: Nose normal.      Mouth/Throat:      Lips: Pink.      Mouth: Mucous membranes are moist. No angioedema.      Pharynx: Oropharynx is clear.   Eyes:      Conjunctiva/sclera: Conjunctivae normal.   Cardiovascular:      Rate and Rhythm: Normal rate.   Pulmonary:      Effort: Pulmonary effort is " normal. No respiratory distress.      Breath sounds: Normal breath sounds. No wheezing.   Musculoskeletal:         General: Normal range of motion.      Cervical back: Normal range of motion.   Skin:     General: Skin is warm and dry.      Findings: Rash present. Rash is urticarial. Rash is not pustular or vesicular.      Comments: Generalized erythemic rash.   Neurological:      General: No focal deficit present.      Mental Status: She is alert and oriented to person, place, and time.      GCS: GCS eye subscore is 4. GCS verbal subscore is 5. GCS motor subscore is 6.   Psychiatric:         Mood and Affect: Mood normal.         Speech: Speech normal.         Behavior: Behavior normal.         Thought Content: Thought content normal.         Judgment: Judgment normal.         Assessment/Plan:   1. Allergic drug rash  - methylPREDNISolone sod succ (SOLU-MEDROL) 125 MG injection 62.5 mg  - methylPREDNISolone (MEDROL DOSEPAK) 4 MG Tablet Therapy Pack; Follow schedule on package instructions.  Dispense: 21 tablet; Refill: 0  - hydrOXYzine HCl (ATARAX) 25 MG Tab; Take 1 tablet by mouth 3 times a day as needed for Itching for up to 7 days.  Dispense: 21 tablet; Refill: 0  -Contingent Medrol starting tomorrow.  -Trigger Avoidance. Can keep log of symptoms to monitor for possible triggers.  -Watch for any signs of infection (redness, pus, pain, increased swelling or fever).  -Avoid scratching to cause any skin breakdown.  -Cool shower or compress may alleviate symptoms.  -OTC Zyrtec or anti-histamine.    Negative Nikolshy's sign. Follow up with PCP. Follow up for worsening symptoms, signs of infection, fever, pain, or any other concerns. Follow up emergently for facial or oral swelling, oral lesions, joint pain, shortness of breath, weakness, dizziness, skin sloughing.    Differential diagnosis, natural history, supportive care, and indications for immediate follow-up discussed.

## 2021-05-11 ASSESSMENT — ENCOUNTER SYMPTOMS
STRIDOR: 0
WHEEZING: 0

## 2021-05-12 ENCOUNTER — TELEMEDICINE2 (OUTPATIENT)
Dept: BEHAVIORAL HEALTH | Facility: CLINIC | Age: 51
End: 2021-05-12
Payer: COMMERCIAL

## 2021-05-12 DIAGNOSIS — F41.1 GAD (GENERALIZED ANXIETY DISORDER): ICD-10-CM

## 2021-05-12 DIAGNOSIS — G47.00 INSOMNIA, UNSPECIFIED TYPE: ICD-10-CM

## 2021-05-12 DIAGNOSIS — F33.41 RECURRENT MAJOR DEPRESSIVE DISORDER, IN PARTIAL REMISSION (HCC): ICD-10-CM

## 2021-05-12 PROCEDURE — 90833 PSYTX W PT W E/M 30 MIN: CPT | Mod: 95 | Performed by: PSYCHIATRY & NEUROLOGY

## 2021-05-12 PROCEDURE — 99204 OFFICE O/P NEW MOD 45 MIN: CPT | Mod: 95 | Performed by: PSYCHIATRY & NEUROLOGY

## 2021-05-12 RX ORDER — SERTRALINE HYDROCHLORIDE 100 MG/1
TABLET, FILM COATED ORAL
Qty: 180 TABLET | Refills: 0 | Status: SHIPPED | OUTPATIENT
Start: 2021-05-12 | End: 2021-07-28 | Stop reason: SDUPTHER

## 2021-05-12 RX ORDER — ALPRAZOLAM 0.25 MG/1
0.25 TABLET ORAL
Qty: 30 TABLET | Refills: 1 | Status: SHIPPED | OUTPATIENT
Start: 2021-06-07 | End: 2021-07-07
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
## 2021-05-28 DIAGNOSIS — G47.00 INSOMNIA, UNSPECIFIED TYPE: ICD-10-CM

## 2021-05-28 NOTE — TELEPHONE ENCOUNTER
Received request via: Patient    Was the patient seen in the last year in this department? Yes    Does the patient have an active prescription (recently filled or refills available) for medication(s) requested? No     Pt is stating they do not have any refills left.   I let her know she will probably need to be seen for another refill.

## 2021-05-28 NOTE — TELEPHONE ENCOUNTER
Pt was told by  she still has a refill left, but I am not seeing it. I can have pt call her other doctor to see if they can get a refill from them.

## 2021-05-28 NOTE — TELEPHONE ENCOUNTER
My intention was for psychiatry to take over prescribing if they were willing.  Did patient address this with Dr. Devlin at her appointment?  Please asked patient to contact Dr. Devlin for refill.  If Dr. Devlin does not want to take over prescribing, I will prescribe.  Please let me know.

## 2021-06-04 RX ORDER — ZOLPIDEM TARTRATE 12.5 MG/1
12.5 TABLET, FILM COATED, EXTENDED RELEASE ORAL NIGHTLY PRN
Qty: 30 TABLET | Refills: 1 | OUTPATIENT
Start: 2021-06-04 | End: 2021-08-03

## 2021-06-04 RX ORDER — ZOLPIDEM TARTRATE 12.5 MG/1
12.5 TABLET, FILM COATED, EXTENDED RELEASE ORAL NIGHTLY PRN
Qty: 30 TABLET | Refills: 0 | Status: SHIPPED | OUTPATIENT
Start: 2021-06-04 | End: 2021-07-08 | Stop reason: SDUPTHER

## 2021-06-04 NOTE — TELEPHONE ENCOUNTER
reviewed. Refill for Ambien 30 days supply sent to pharmacy.  I will reach out to Dr. Devlin to see if she will be taking over prescribing.

## 2021-06-10 ENCOUNTER — TELEPHONE (OUTPATIENT)
Dept: MEDICAL GROUP | Facility: PHYSICIAN GROUP | Age: 51
End: 2021-06-10

## 2021-06-10 NOTE — TELEPHONE ENCOUNTER
Phone Number Called: [phone] (home)       Call outcome: Spoke to patient regarding message below.    Message: Called to inform patient sent refill for Ambien to your pharmacy (for a 30-day supply).  I will reach out to Dr. Devlin to inquire if she will be taking over Ambien prescription.

## 2021-06-16 ENCOUNTER — OFFICE VISIT (OUTPATIENT)
Dept: MEDICAL GROUP | Facility: PHYSICIAN GROUP | Age: 51
End: 2021-06-16
Payer: COMMERCIAL

## 2021-06-16 ENCOUNTER — APPOINTMENT (OUTPATIENT)
Dept: BEHAVIORAL HEALTH | Facility: CLINIC | Age: 51
End: 2021-06-16
Payer: COMMERCIAL

## 2021-06-16 VITALS
DIASTOLIC BLOOD PRESSURE: 72 MMHG | OXYGEN SATURATION: 95 % | WEIGHT: 177.4 LBS | TEMPERATURE: 99.3 F | HEIGHT: 66 IN | RESPIRATION RATE: 12 BRPM | SYSTOLIC BLOOD PRESSURE: 120 MMHG | BODY MASS INDEX: 28.51 KG/M2 | HEART RATE: 64 BPM

## 2021-06-16 DIAGNOSIS — Z90.710 S/P HYSTERECTOMY WITH OOPHORECTOMY: ICD-10-CM

## 2021-06-16 DIAGNOSIS — Z90.721 S/P HYSTERECTOMY WITH OOPHORECTOMY: ICD-10-CM

## 2021-06-16 DIAGNOSIS — F41.9 ANXIETY: ICD-10-CM

## 2021-06-16 DIAGNOSIS — F33.41 RECURRENT MAJOR DEPRESSIVE DISORDER, IN PARTIAL REMISSION (HCC): ICD-10-CM

## 2021-06-16 DIAGNOSIS — N95.1 MENOPAUSAL SYMPTOM: ICD-10-CM

## 2021-06-16 DIAGNOSIS — G47.00 INSOMNIA, UNSPECIFIED TYPE: ICD-10-CM

## 2021-06-16 PROCEDURE — 99214 OFFICE O/P EST MOD 30 MIN: CPT | Performed by: NURSE PRACTITIONER

## 2021-06-16 RX ORDER — ESTRADIOL 0.5 MG/.5G
0.5 GEL TOPICAL DAILY
Qty: 30 EACH | Refills: 11 | Status: SHIPPED | OUTPATIENT
Start: 2021-06-16 | End: 2021-12-02

## 2021-06-16 ASSESSMENT — PATIENT HEALTH QUESTIONNAIRE - PHQ9
9. THOUGHTS THAT YOU WOULD BE BETTER OFF DEAD, OR OF HURTING YOURSELF: NOT AT ALL
3. TROUBLE FALLING OR STAYING ASLEEP OR SLEEPING TOO MUCH: NOT AT ALL
8. MOVING OR SPEAKING SO SLOWLY THAT OTHER PEOPLE COULD HAVE NOTICED. OR THE OPPOSITE, BEING SO FIGETY OR RESTLESS THAT YOU HAVE BEEN MOVING AROUND A LOT MORE THAN USUAL: NOT AT ALL
5. POOR APPETITE OR OVEREATING: NOT AT ALL
1. LITTLE INTEREST OR PLEASURE IN DOING THINGS: NOT AT ALL
2. FEELING DOWN, DEPRESSED, IRRITABLE, OR HOPELESS: SEVERAL DAYS
SUM OF ALL RESPONSES TO PHQ QUESTIONS 1-9: 4
7. TROUBLE CONCENTRATING ON THINGS, SUCH AS READING THE NEWSPAPER OR WATCHING TELEVISION: SEVERAL DAYS
4. FEELING TIRED OR HAVING LITTLE ENERGY: SEVERAL DAYS
SUM OF ALL RESPONSES TO PHQ9 QUESTIONS 1 AND 2: 1
6. FEELING BAD ABOUT YOURSELF - OR THAT YOU ARE A FAILURE OR HAVE LET YOURSELF OR YOUR FAMILY DOWN: SEVERAL DAYS

## 2021-06-16 ASSESSMENT — FIBROSIS 4 INDEX: FIB4 SCORE: 0.87

## 2021-06-16 NOTE — ASSESSMENT & PLAN NOTE
Chronic health problem, well-controlled.  Taking zolpidem CR 12.5 mg nightly.  Has been taking Acacian for many years.  Denies adverse effects.  She recently consulted with psychiatry and thought that psychiatry would take over prescribing.  There was a misunderstanding and psychiatry did not take over.  I did give patient 1 refill to get her by until she contact psychiatry.  I did message Dr. Devlin, who said she would be glad to refill medication if patient contacts her.

## 2021-06-16 NOTE — ASSESSMENT & PLAN NOTE
Chronic health problem, improved control.  Consulted with psychiatry, Dr. Devlin about a month ago.  Notes are not visible to me.  Patient reports that she has gradually increase Zoloft to 100 mg twice a day.  Was prescribed a tapering dose of alprazolam.  Patient reports she is managing well with 0.25 mg alprazolam.  Will even go some days without taking it.  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  She is uncertain if she can go back to psychiatry as she received a bill for $550.  The bill does not show that the insurance has paid yet.  Patient will wait for explanation of benefits in the mail.  If that does not, she will call her insurance company for explanation of benefits.

## 2021-06-16 NOTE — ASSESSMENT & PLAN NOTE
Patient is status post hysterectomy and bilateral oophorectomy in 2011 in 2016.  She tapered herself off of estradiol patch about 6 months ago.  Reports that hot flashes and flushing have been very severe in the last month, vaginal dryness.  She is also gaining weight.  She is wanting to resume estrogen replacement.  She would prefer Divigel as she has used this in the distant past with good results.  Up-to-date on mammogram.

## 2021-06-16 NOTE — PROGRESS NOTES
CC: Hot flashes, depression anxiety    HISTORY OF THE PRESENT ILLNESS: Patient is a 50 y.o. female. This pleasant patient is here today for evaluation and management of the following health problems.      Recurrent major depressive disorder, in partial remission (HCC)  Chronic health problem, improved control.  Consulted with psychiatry, Dr. Devlin about a month ago.  Notes are not visible to me.  Patient reports that she has gradually increase Zoloft to 100 mg twice a day.  Was prescribed a tapering dose of alprazolam.  Patient reports she is managing well with 0.25 mg alprazolam.  Will even go some days without taking it.  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  She is uncertain if she can go back to psychiatry as she received a bill for $550.  The bill does not show that the insurance has paid yet.  Patient will wait for explanation of benefits in the mail.  If that does not, she will call her insurance company for explanation of benefits.    S/P hysterectomy with oophorectomy  Patient is status post hysterectomy and bilateral oophorectomy in 2011 in 2016.  She tapered herself off of estradiol patch about 6 months ago.  Reports that hot flashes and flushing have been very severe in the last month, vaginal dryness.  She is also gaining weight.  She is wanting to resume estrogen replacement.  She would prefer Divigel as she has used this in the distant past with good results.  Up-to-date on mammogram.      Insomnia  Chronic health problem, well-controlled.  Taking zolpidem CR 12.5 mg nightly.  Has been taking Acacian for many years.  Denies adverse effects.  She recently consulted with psychiatry and thought that psychiatry would take over prescribing.  There was a misunderstanding and psychiatry did not take over.  I did give patient 1 refill to get her by until she contact psychiatry.  I did  message Dr. Devlin, who said she would be glad to refill medication if patient contacts her.    Anxiety  Please see additional notes under depression.      Allergies: Sulfa drugs and Macrobid [nitrofurantoin]    Current Outpatient Medications Ordered in Epic   Medication Sig Dispense Refill   • Estradiol 0.5 MG/0.5GM Gel Place 0.5 mg on the skin every day. 30 Each 11   • zolpidem (AMBIEN CR) 12.5 MG CR tablet Take 1 tablet by mouth at bedtime as needed for Sleep for up to 60 days. 30 tablet 0   • sertraline (ZOLOFT) 100 MG Tab Take 1.5 tablets by mouth once a day x 14 days, then take 2 tablets by mouth once a day (Patient taking differently: 100 mg 2 times a day. Take 1.5 tablets by mouth once a day x 14 days, then take 2 tablets by mouth once a day) 180 tablet 0   • ALPRAZolam (XANAX) 0.25 MG Tab Take 1 tablet by mouth every day for 30 days. 30 tablet 1     No current Epic-ordered facility-administered medications on file.       Past Medical History:   Diagnosis Date   • Allergy    • Anxiety    • Depression        Past Surgical History:   Procedure Laterality Date   • ABDOMINAL HYSTERECTOMY TOTAL      ovaries removed   • LAPAROSCOPY     • PRIMARY C SECTION         Social History     Tobacco Use   • Smoking status: Former Smoker     Years: 2.00     Types: Cigarettes     Quit date:      Years since quittin.4   • Smokeless tobacco: Never Used   Vaping Use   • Vaping Use: Never used   Substance Use Topics   • Alcohol use: Yes     Comment: Occ   • Drug use: Never       Family History   Problem Relation Age of Onset   • Arthritis Mother    • Cancer Father         bone cancer as child   • No Known Problems Sister    • No Known Problems Brother    • Heart Failure Maternal Grandmother        ROS:   As in HPI, otherwise negative for chest pain, dyspnea, abdominal pain, dysuria, blood in stool, fever           Exam: /72 (BP Location: Left arm, Patient Position: Sitting, BP Cuff Size: Adult)   Pulse 64   Temp  "37.4 °C (99.3 °F) (Temporal)   Resp 12   Ht 1.676 m (5' 6\")   Wt 80.5 kg (177 lb 6.4 oz)   SpO2 95%  Body mass index is 28.63 kg/m².    General: Alert, delightful, well nourished, well developed female in NAD  Neck: Supple without bruit.   Pulmonary: Clear to ausculation.  Normal effort. No rales, ronchi, or wheezing.  Cardiovascular: Normal rate and rhythm without murmur.   Neurologic: Grossly nonfocal  Skin: Warm and dry.    Musculoskeletal: Normal gait.   Psych: Normal mood and affect. Alert and oriented. Judgment and insight is normal.    Please note that this dictation was created using voice recognition software. I have made every reasonable attempt to correct obvious errors, but I expect that there are errors of grammar and possibly content that I did not discover before finalizing the note.      Assessment/Plan  1. Recurrent major depressive disorder, in partial remission (HCC)  Improved control.  Continue with Zoloft, no changes.  Patient tolerating tapering dose of alprazolam.  She will find out through her insurance the actual coverage for psychiatry appointments.  If co-pay is too large, patient will follow up with me in clinic.    2. S/P hysterectomy with oophorectomy  Patient having worsening postmenopausal symptoms.  Will prescribe Divigel.  Instructions and side effects reviewed with patient.  This is a lesser dose than what she was taking in the past.  - Estradiol 0.5 MG/0.5GM Gel; Place 0.5 mg on the skin every day.  Dispense: 30 Each; Refill: 11    3. Menopausal symptom  As in #2  - Estradiol 0.5 MG/0.5GM Gel; Place 0.5 mg on the skin every day.  Dispense: 30 Each; Refill: 11    4. Insomnia, unspecified type  Well-controlled, continue with Ambien.  Patient will reach out to psychiatry for further refills.  She will follow up with me if psychiatry is not able to refill.    5. Anxiety  As in #1.      "

## 2021-07-08 ENCOUNTER — TELEPHONE (OUTPATIENT)
Dept: MEDICAL GROUP | Facility: PHYSICIAN GROUP | Age: 51
End: 2021-07-08

## 2021-07-08 DIAGNOSIS — G47.00 INSOMNIA, UNSPECIFIED TYPE: ICD-10-CM

## 2021-07-08 RX ORDER — ZOLPIDEM TARTRATE 12.5 MG/1
12.5 TABLET, FILM COATED, EXTENDED RELEASE ORAL NIGHTLY PRN
Qty: 30 TABLET | Refills: 1 | Status: SHIPPED | OUTPATIENT
Start: 2021-07-08 | End: 2021-07-28 | Stop reason: SDUPTHER

## 2021-07-08 NOTE — TELEPHONE ENCOUNTER
Pt called curious about prescription, was wondering if you have talked with her as she has tried to reach out to provider and she has not gotten a response. She picked up the script we sent in June, but is not out of medications again.    Please advise.

## 2021-07-09 NOTE — TELEPHONE ENCOUNTER
I have sent a prescription for Ambien to patient's pharmacy.  Has 1 refill on it.  Please explained to patient that she needs to schedule an appointment with Dr. Devlin in the next couple months to review medications and for further refills.

## 2021-07-28 ENCOUNTER — TELEMEDICINE2 (OUTPATIENT)
Dept: BEHAVIORAL HEALTH | Facility: CLINIC | Age: 51
End: 2021-07-28
Payer: COMMERCIAL

## 2021-07-28 DIAGNOSIS — Z79.899 LONG-TERM CURRENT USE OF BENZODIAZEPINE: ICD-10-CM

## 2021-07-28 DIAGNOSIS — F41.9 ANXIETY: ICD-10-CM

## 2021-07-28 DIAGNOSIS — G47.00 INSOMNIA, UNSPECIFIED TYPE: ICD-10-CM

## 2021-07-28 DIAGNOSIS — F33.41 RECURRENT MAJOR DEPRESSIVE DISORDER, IN PARTIAL REMISSION (HCC): ICD-10-CM

## 2021-07-28 DIAGNOSIS — F41.1 GAD (GENERALIZED ANXIETY DISORDER): ICD-10-CM

## 2021-07-28 PROCEDURE — 99214 OFFICE O/P EST MOD 30 MIN: CPT | Mod: 95 | Performed by: PSYCHIATRY & NEUROLOGY

## 2021-07-28 PROCEDURE — 90833 PSYTX W PT W E/M 30 MIN: CPT | Mod: 95 | Performed by: PSYCHIATRY & NEUROLOGY

## 2021-07-28 RX ORDER — ZOLPIDEM TARTRATE 12.5 MG/1
12.5 TABLET, FILM COATED, EXTENDED RELEASE ORAL NIGHTLY PRN
Qty: 30 TABLET | Refills: 1 | Status: SHIPPED | OUTPATIENT
Start: 2021-08-09 | End: 2021-09-08

## 2021-07-28 RX ORDER — SERTRALINE HYDROCHLORIDE 100 MG/1
TABLET, FILM COATED ORAL
Qty: 45 TABLET | Refills: 2 | Status: SHIPPED | OUTPATIENT
Start: 2021-07-28 | End: 2021-09-15 | Stop reason: SDUPTHER

## 2021-07-28 RX ORDER — ALPRAZOLAM 0.5 MG/1
0.5 TABLET ORAL NIGHTLY PRN
Qty: 30 TABLET | Refills: 1 | Status: SHIPPED | OUTPATIENT
Start: 2021-07-28 | End: 2021-08-27
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
## 2021-09-15 ENCOUNTER — TELEMEDICINE2 (OUTPATIENT)
Dept: BEHAVIORAL HEALTH | Facility: CLINIC | Age: 51
End: 2021-09-15
Payer: COMMERCIAL

## 2021-09-15 DIAGNOSIS — F41.1 GAD (GENERALIZED ANXIETY DISORDER): ICD-10-CM

## 2021-09-15 DIAGNOSIS — F51.01 PRIMARY INSOMNIA: ICD-10-CM

## 2021-09-15 DIAGNOSIS — F41.9 ANXIETY: ICD-10-CM

## 2021-09-15 DIAGNOSIS — F33.41 RECURRENT MAJOR DEPRESSIVE DISORDER, IN PARTIAL REMISSION (HCC): ICD-10-CM

## 2021-09-15 PROCEDURE — 99214 OFFICE O/P EST MOD 30 MIN: CPT | Mod: 95 | Performed by: PSYCHIATRY & NEUROLOGY

## 2021-09-15 RX ORDER — SERTRALINE HYDROCHLORIDE 100 MG/1
TABLET, FILM COATED ORAL
Qty: 135 TABLET | Refills: 0 | Status: SHIPPED | OUTPATIENT
Start: 2021-09-15 | End: 2022-01-05 | Stop reason: SDUPTHER

## 2021-09-15 RX ORDER — ALPRAZOLAM 0.5 MG/1
0.5 TABLET ORAL NIGHTLY PRN
Qty: 30 TABLET | Refills: 0 | Status: SHIPPED | OUTPATIENT
Start: 2021-11-03 | End: 2021-12-03

## 2021-09-15 RX ORDER — ZOLPIDEM TARTRATE 12.5 MG/1
12.5 TABLET, FILM COATED, EXTENDED RELEASE ORAL NIGHTLY PRN
Qty: 30 TABLET | Refills: 0 | Status: SHIPPED | OUTPATIENT
Start: 2021-10-07 | End: 2021-11-11 | Stop reason: SDUPTHER
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
## 2021-10-04 ENCOUNTER — HOSPITAL ENCOUNTER (OUTPATIENT)
Facility: MEDICAL CENTER | Age: 51
End: 2021-10-04
Attending: PHYSICIAN ASSISTANT
Payer: COMMERCIAL

## 2021-10-04 ENCOUNTER — OFFICE VISIT (OUTPATIENT)
Dept: URGENT CARE | Facility: PHYSICIAN GROUP | Age: 51
End: 2021-10-04
Payer: COMMERCIAL

## 2021-10-04 VITALS
HEIGHT: 66 IN | DIASTOLIC BLOOD PRESSURE: 78 MMHG | SYSTOLIC BLOOD PRESSURE: 116 MMHG | TEMPERATURE: 97.4 F | BODY MASS INDEX: 29.57 KG/M2 | WEIGHT: 184 LBS | OXYGEN SATURATION: 97 % | HEART RATE: 54 BPM | RESPIRATION RATE: 16 BRPM

## 2021-10-04 DIAGNOSIS — N30.01 ACUTE CYSTITIS WITH HEMATURIA: ICD-10-CM

## 2021-10-04 DIAGNOSIS — Z86.19 HISTORY OF CLOSTRIDIUM DIFFICILE INFECTION: ICD-10-CM

## 2021-10-04 DIAGNOSIS — R30.0 DYSURIA: ICD-10-CM

## 2021-10-04 LAB
APPEARANCE UR: NORMAL
BILIRUB UR STRIP-MCNC: NEGATIVE MG/DL
COLOR UR AUTO: YELLOW
GLUCOSE UR STRIP.AUTO-MCNC: NEGATIVE MG/DL
KETONES UR STRIP.AUTO-MCNC: NEGATIVE MG/DL
LEUKOCYTE ESTERASE UR QL STRIP.AUTO: NORMAL
NITRITE UR QL STRIP.AUTO: NEGATIVE
PH UR STRIP.AUTO: 6.5 [PH] (ref 5–8)
PROT UR QL STRIP: NEGATIVE MG/DL
RBC UR QL AUTO: NORMAL
SP GR UR STRIP.AUTO: 1.02
UROBILINOGEN UR STRIP-MCNC: NEGATIVE MG/DL

## 2021-10-04 PROCEDURE — 87186 SC STD MICRODIL/AGAR DIL: CPT

## 2021-10-04 PROCEDURE — 87077 CULTURE AEROBIC IDENTIFY: CPT | Mod: 91

## 2021-10-04 PROCEDURE — 81002 URINALYSIS NONAUTO W/O SCOPE: CPT | Performed by: PHYSICIAN ASSISTANT

## 2021-10-04 PROCEDURE — 87086 URINE CULTURE/COLONY COUNT: CPT

## 2021-10-04 PROCEDURE — 99214 OFFICE O/P EST MOD 30 MIN: CPT | Performed by: PHYSICIAN ASSISTANT

## 2021-10-04 RX ORDER — CEFDINIR 300 MG/1
300 CAPSULE ORAL EVERY 12 HOURS
Qty: 10 CAPSULE | Refills: 0 | Status: SHIPPED | OUTPATIENT
Start: 2021-10-04 | End: 2021-10-09

## 2021-10-04 ASSESSMENT — FIBROSIS 4 INDEX: FIB4 SCORE: 0.89

## 2021-10-04 ASSESSMENT — ENCOUNTER SYMPTOMS
FLANK PAIN: 0
NAUSEA: 0
SWEATS: 0
CHILLS: 0
VOMITING: 0

## 2021-10-04 NOTE — PROGRESS NOTES
Subjective:   Johanna Hunter is a 51 y.o. female who presents for Dysuria (Back pain, cloudy, and painful , x1day )        History of C. difficile associated with antibiotic use.  She is unsure of what antibiotic precipitated this but by her description it sounds like either Augmentin or clindamycin.  Does not tolerate Macrobid due to full body rash and itching after prior use.  She is also allergic to Bactrim.    Dysuria   This is a new problem. The current episode started yesterday. The problem occurs every urination. The problem has been rapidly worsening. The quality of the pain is described as burning. The pain is moderate. There has been no fever. There is no history of pyelonephritis. Associated symptoms include frequency and urgency. Pertinent negatives include no chills, discharge, flank pain, hematuria, hesitancy, nausea, possible pregnancy, sweats or vomiting. She has tried nothing for the symptoms. The treatment provided no relief. There is no history of recurrent UTIs.     Review of Systems   Constitutional: Negative for chills.   Gastrointestinal: Negative for nausea and vomiting.   Genitourinary: Positive for dysuria, frequency and urgency. Negative for flank pain, hematuria and hesitancy.       PMH:  has a past medical history of Allergy, Anxiety, and Depression.  MEDS:   Current Outpatient Medications:   •  cefdinir (OMNICEF) 300 MG Cap, Take 1 Capsule by mouth every 12 hours for 5 days., Disp: 10 Capsule, Rfl: 0  •  sertraline (ZOLOFT) 100 MG Tab, Take 1.5 tablets by mouth once a day, Disp: 135 Tablet, Rfl: 0  •  [START ON 11/3/2021] ALPRAZolam (XANAX) 0.5 MG Tab, Take 1 Tablet by mouth at bedtime as needed for Sleep for up to 30 days., Disp: 30 Tablet, Rfl: 0  •  [START ON 10/7/2021] zolpidem (AMBIEN CR) 12.5 MG CR tablet, Take 1 Tablet by mouth at bedtime as needed for Sleep for up to 30 days., Disp: 30 Tablet, Rfl: 0  •  Estradiol 0.5 MG/0.5GM Gel, Place 0.5 mg on the skin every day. (Patient not  "taking: Reported on 10/4/2021), Disp: 30 Each, Rfl: 11  ALLERGIES:   Allergies   Allergen Reactions   • Sulfa Drugs Rash     o   • Macrobid [Nitrofurantoin] Hives     SURGHX:   Past Surgical History:   Procedure Laterality Date   • ABDOMINAL HYSTERECTOMY TOTAL      ovaries removed   • LAPAROSCOPY     • PRIMARY C SECTION       SOCHX:  reports that she quit smoking about 28 years ago. Her smoking use included cigarettes. She quit after 2.00 years of use. She has never used smokeless tobacco. She reports current alcohol use. She reports that she does not use drugs.  FH: Family history was reviewed, no pertinent findings to report   Objective:   /78   Pulse (!) 54   Temp 36.3 °C (97.4 °F) (Temporal)   Resp 16   Ht 1.676 m (5' 6\")   Wt 83.5 kg (184 lb)   SpO2 97%   BMI 29.70 kg/m²   Physical Exam  Vitals reviewed.   Constitutional:       General: She is not in acute distress.     Appearance: Normal appearance. She is well-developed. She is not toxic-appearing.   HENT:      Head: Normocephalic and atraumatic.      Right Ear: External ear normal.      Left Ear: External ear normal.      Nose: Nose normal.   Eyes:      General: Gaze aligned appropriately.   Cardiovascular:      Rate and Rhythm: Normal rate and regular rhythm.   Pulmonary:      Effort: Pulmonary effort is normal. No respiratory distress.      Breath sounds: No stridor.   Abdominal:      General: Abdomen is flat.      Palpations: Abdomen is soft.      Tenderness: There is no abdominal tenderness. There is no right CVA tenderness, left CVA tenderness, guarding or rebound.   Musculoskeletal:      Cervical back: Neck supple.   Skin:     General: Skin is warm and dry.      Capillary Refill: Capillary refill takes less than 2 seconds.   Neurological:      Mental Status: She is alert and oriented to person, place, and time.      Comments: CN2-12 grossly intact   Psychiatric:         Speech: Speech normal.         Behavior: Behavior normal.         "       Assessment/Plan:   1. Acute cystitis with hematuria  - cefdinir (OMNICEF) 300 MG Cap; Take 1 Capsule by mouth every 12 hours for 5 days.  Dispense: 10 Capsule; Refill: 0  - Urine Culture; Future    2. Dysuria  - POCT Urinalysis  - cefdinir (OMNICEF) 300 MG Cap; Take 1 Capsule by mouth every 12 hours for 5 days.  Dispense: 10 Capsule; Refill: 0    3. History of Clostridium difficile infection    Patient records reviewed.  She was evaluated in clinic for urinary symptoms on 4/26/2021.  Urine culture was positive for E. coli.  Bacteria showed good sensitivity to all antibiotics.    UA and PE consistent with acute cystitis. Pyelonephritis unlikely as pt has no flank pain, CVA tenderness, N/V, F/C.   Patient will take oral probiotic daily.  Additionally I would like her to eat a small amount of [employer]bra[employer] yogurt around lunchtime daily as well for the next week.  Drink 8, 8oz glasses of water every day.  If symptoms fail to improve in 48 hours, worsen or you develop fever, flank pain, nausea, vomiting, or other new symptoms return to the clinic immediately or go the ER.    Differential diagnosis, natural history, supportive care, and indications for immediate follow-up discussed.

## 2021-10-07 LAB
BACTERIA UR CULT: ABNORMAL
SIGNIFICANT IND 70042: ABNORMAL
SITE SITE: ABNORMAL
SOURCE SOURCE: ABNORMAL

## 2021-10-09 ENCOUNTER — APPOINTMENT (OUTPATIENT)
Dept: RADIOLOGY | Facility: IMAGING CENTER | Age: 51
End: 2021-10-09
Attending: FAMILY MEDICINE
Payer: OTHER MISCELLANEOUS

## 2021-10-09 ENCOUNTER — OFFICE VISIT (OUTPATIENT)
Dept: URGENT CARE | Facility: PHYSICIAN GROUP | Age: 51
End: 2021-10-09
Payer: COMMERCIAL

## 2021-10-09 ENCOUNTER — APPOINTMENT (OUTPATIENT)
Dept: RADIOLOGY | Facility: IMAGING CENTER | Age: 51
End: 2021-10-09
Attending: FAMILY MEDICINE
Payer: COMMERCIAL

## 2021-10-09 VITALS
HEART RATE: 67 BPM | WEIGHT: 185.8 LBS | SYSTOLIC BLOOD PRESSURE: 122 MMHG | DIASTOLIC BLOOD PRESSURE: 82 MMHG | OXYGEN SATURATION: 98 % | BODY MASS INDEX: 29.86 KG/M2 | HEIGHT: 66 IN | TEMPERATURE: 98.5 F

## 2021-10-09 DIAGNOSIS — M54.2 NECK PAIN: ICD-10-CM

## 2021-10-09 DIAGNOSIS — M54.9 UPPER BACK PAIN: ICD-10-CM
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
## 2021-10-09 PROCEDURE — 72070 X-RAY EXAM THORAC SPINE 2VWS: CPT | Mod: TC,FY | Performed by: FAMILY MEDICINE

## 2021-10-09 PROCEDURE — 99214 OFFICE O/P EST MOD 30 MIN: CPT | Performed by: FAMILY MEDICINE

## 2021-10-09 PROCEDURE — 72040 X-RAY EXAM NECK SPINE 2-3 VW: CPT | Mod: TC,FY | Performed by: FAMILY MEDICINE

## 2021-10-09 RX ORDER — METHOCARBAMOL 750 MG/1
TABLET, FILM COATED ORAL
Qty: 30 TABLET | Refills: 0 | Status: SHIPPED | OUTPATIENT
Start: 2021-10-09 | End: 2021-10-22 | Stop reason: SDUPTHER

## 2021-10-09 ASSESSMENT — FIBROSIS 4 INDEX: FIB4 SCORE: 0.89

## 2021-10-09 NOTE — LETTER
October 9, 2021         Patient: Johanna Hunter   YOB: 1970   Date of Visit: 10/9/2021           To Whom it May Concern:    Johanna Hunter was seen in my clinic on 10/9/2021.     Please excuse from work for 10/9 and 10/10/21 due to medical condition.    May return to work on 10/11/21, but she should do light duty if her neck and upper back still hurt. She may do what she can tolerate at work.    If you have any questions or concerns, please don't hesitate to call.        Sincerely,           Ernesto Brothers M.D.  Electronically Signed
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
## 2021-10-22 ENCOUNTER — OFFICE VISIT (OUTPATIENT)
Dept: URGENT CARE | Facility: PHYSICIAN GROUP | Age: 51
End: 2021-10-22

## 2021-10-22 VITALS
BODY MASS INDEX: 28.19 KG/M2 | DIASTOLIC BLOOD PRESSURE: 84 MMHG | HEIGHT: 68 IN | WEIGHT: 186 LBS | RESPIRATION RATE: 16 BRPM | HEART RATE: 76 BPM | OXYGEN SATURATION: 96 % | TEMPERATURE: 98.3 F | SYSTOLIC BLOOD PRESSURE: 122 MMHG
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
## 2021-10-22 DIAGNOSIS — M54.2 NECK PAIN: ICD-10-CM

## 2021-10-22 DIAGNOSIS — M54.9 UPPER BACK PAIN: ICD-10-CM

## 2021-10-22 DIAGNOSIS — R51.9 ACUTE NONINTRACTABLE HEADACHE, UNSPECIFIED HEADACHE TYPE: ICD-10-CM

## 2021-10-22 DIAGNOSIS — M54.12 ACUTE CERVICAL RADICULOPATHY: ICD-10-CM

## 2021-10-22 PROCEDURE — 99214 OFFICE O/P EST MOD 30 MIN: CPT | Performed by: FAMILY MEDICINE

## 2021-10-22 RX ORDER — PREDNISONE 20 MG/1
TABLET ORAL
Qty: 20 TABLET | Refills: 0 | Status: SHIPPED | OUTPATIENT
Start: 2021-10-22 | End: 2021-11-01

## 2021-10-22 RX ORDER — METHOCARBAMOL 750 MG/1
TABLET, FILM COATED ORAL
Qty: 40 TABLET | Refills: 0 | Status: SHIPPED | OUTPATIENT
Start: 2021-10-22 | End: 2021-12-01 | Stop reason: SDUPTHER

## 2021-10-22 ASSESSMENT — FIBROSIS 4 INDEX: FIB4 SCORE: 0.89
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
## 2021-10-28 ENCOUNTER — OFFICE VISIT (OUTPATIENT)
Dept: MEDICAL GROUP | Facility: PHYSICIAN GROUP | Age: 51
End: 2021-10-28
Payer: COMMERCIAL

## 2021-10-28 VITALS
HEIGHT: 68 IN | DIASTOLIC BLOOD PRESSURE: 78 MMHG | OXYGEN SATURATION: 95 % | HEART RATE: 71 BPM | BODY MASS INDEX: 28.61 KG/M2 | TEMPERATURE: 98 F | WEIGHT: 188.8 LBS | SYSTOLIC BLOOD PRESSURE: 120 MMHG | RESPIRATION RATE: 12 BRPM

## 2021-10-28 DIAGNOSIS — M54.9 UPPER BACK PAIN: ICD-10-CM

## 2021-10-28 DIAGNOSIS — M54.12 ACUTE CERVICAL RADICULOPATHY: ICD-10-CM
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
## 2021-10-28 DIAGNOSIS — M54.2 NECK PAIN: ICD-10-CM

## 2021-10-28 PROCEDURE — 99214 OFFICE O/P EST MOD 30 MIN: CPT | Performed by: NURSE PRACTITIONER

## 2021-10-28 RX ORDER — GABAPENTIN 300 MG/1
CAPSULE ORAL
Qty: 60 CAPSULE | Refills: 2 | Status: SHIPPED | OUTPATIENT
Start: 2021-10-28

## 2021-10-28 ASSESSMENT — FIBROSIS 4 INDEX: FIB4 SCORE: 0.89
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
## 2021-11-11 DIAGNOSIS — F51.01 PRIMARY INSOMNIA: ICD-10-CM

## 2021-11-11 RX ORDER — ZOLPIDEM TARTRATE 12.5 MG/1
12.5 TABLET, FILM COATED, EXTENDED RELEASE ORAL NIGHTLY PRN
Qty: 30 TABLET | Refills: 1 | Status: SHIPPED | OUTPATIENT
Start: 2021-11-11 | End: 2021-12-11

## 2021-12-01 ENCOUNTER — OFFICE VISIT (OUTPATIENT)
Dept: MEDICAL GROUP | Facility: PHYSICIAN GROUP | Age: 51
End: 2021-12-01
Payer: COMMERCIAL

## 2021-12-01 VITALS
HEART RATE: 74 BPM | WEIGHT: 195.6 LBS | TEMPERATURE: 98.3 F | BODY MASS INDEX: 29.64 KG/M2 | HEIGHT: 68 IN | SYSTOLIC BLOOD PRESSURE: 122 MMHG | DIASTOLIC BLOOD PRESSURE: 68 MMHG | OXYGEN SATURATION: 95 % | RESPIRATION RATE: 14 BRPM

## 2021-12-01 DIAGNOSIS — H60.502 ACUTE OTITIS EXTERNA OF LEFT EAR, UNSPECIFIED TYPE: ICD-10-CM

## 2021-12-01 DIAGNOSIS — M54.12 ACUTE CERVICAL RADICULOPATHY: ICD-10-CM

## 2021-12-01 DIAGNOSIS — R51.9 ACUTE NONINTRACTABLE HEADACHE, UNSPECIFIED HEADACHE TYPE: ICD-10-CM

## 2021-12-01 DIAGNOSIS — M54.2 NECK PAIN: ICD-10-CM

## 2021-12-01 DIAGNOSIS — R51.9 NONINTRACTABLE HEADACHE, UNSPECIFIED CHRONICITY PATTERN, UNSPECIFIED HEADACHE TYPE: ICD-10-CM

## 2021-12-01 DIAGNOSIS — M54.9 UPPER BACK PAIN: ICD-10-CM
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
## 2021-12-01 PROCEDURE — 99214 OFFICE O/P EST MOD 30 MIN: CPT | Performed by: NURSE PRACTITIONER

## 2021-12-01 RX ORDER — METHOCARBAMOL 750 MG/1
TABLET, FILM COATED ORAL
Qty: 90 TABLET | Refills: 2 | Status: SHIPPED | OUTPATIENT
Start: 2021-12-01

## 2021-12-01 RX ORDER — BUTALBITAL, ACETAMINOPHEN AND CAFFEINE 50; 325; 40 MG/1; MG/1; MG/1
1 TABLET ORAL EVERY 4 HOURS PRN
Qty: 10 TABLET | Refills: 2 | Status: SHIPPED | OUTPATIENT
Start: 2021-12-01 | End: 2022-03-01

## 2021-12-01 ASSESSMENT — FIBROSIS 4 INDEX: FIB4 SCORE: 0.89

## 2021-12-01 NOTE — LETTER
December 1, 2021    To Whom It May Concern:         This is confirmation that Johanna Hunter attended her scheduled appointment with NICOLAS Grier on 12/01/21.      Please excuse from work until 3/1/2022 or until her symptoms resolve.       If you have any questions or concerns, please don't hesitate to call.           If you have any questions please do not hesitate to call me at the phone number listed below.    Sincerely,          DARRION [email or web address].  [phone]
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
## 2021-12-01 NOTE — LETTER
[facility]  NICOLAS Grier  [address] [place] [address]  Fax: [phone]   Authorization for Release/Disclosure of   Protected Health Information   Name: DAVID HUNTER : 1970 SSN: [id number]   Address: [address] [place] [id number] Phone:    [phone] (home)    I authorize the entity listed below to release/disclose the PHI below to:   [facility]/NICOLAS Grier and NICOLAS Grier   Provider or Entity Name:     Address   City, State, Zip   Phone:      Fax:     Reason for request: continuity of care   Information to be released:    [  ] LAST COLONOSCOPY,  including any PATH REPORT and follow-up  [  ] LAST FIT/COLOGUARD RESULT [  ] LAST DEXA  [  ] LAST MAMMOGRAM  [  ] LAST PAP  [  ] LAST LABS [  ] RETINA EXAM REPORT  [  ] IMMUNIZATION RECORDS  [  ] Release all info      [  ] Check here and initial the line next to each item to release ALL health information INCLUDING  _____ Care and treatment for drug and / or alcohol abuse  _____ HIV testing, infection status, or AIDS  _____ Genetic Testing    DATES OF SERVICE OR TIME PERIOD TO BE DISCLOSED: _____________  I understand and acknowledge that:  * This Authorization may be revoked at any time by you in writing, except if your health information has already been used or disclosed.  * Your health information that will be used or disclosed as a result of you signing this authorization could be re-disclosed by the recipient. If this occurs, your re-disclosed health information may no longer be protected by State or Federal laws.  * You may refuse to sign this Authorization. Your refusal will not affect your ability to obtain treatment.  * This Authorization becomes effective upon signing and will  on (date) __________.      If no date is indicated, this Authorization will  one (1) year from the signature date.    Name: David Hunter    Signature:   Date:     2021        PLEASE FAX REQUESTED RECORDS BACK TO: [phone]
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
## 2021-12-02 PROBLEM — H60.502 ACUTE OTITIS EXTERNA OF LEFT EAR: Status: ACTIVE | Noted: 2021-12-02

## 2021-12-03 NOTE — ASSESSMENT & PLAN NOTE
Patient reports 1 to 2-week onset of left ear pain or feeling of swelling in her ear canal.  Denies fever, sore throat, sinus congestion.

## 2021-12-09 ENCOUNTER — TELEPHONE (OUTPATIENT)
Dept: MEDICAL GROUP | Facility: PHYSICIAN GROUP | Age: 51
End: 2021-12-09

## 2021-12-09 ENCOUNTER — PATIENT MESSAGE (OUTPATIENT)
Dept: MEDICAL GROUP | Facility: PHYSICIAN GROUP | Age: 51
End: 2021-12-09

## 2021-12-09 DIAGNOSIS — M54.12 ACUTE CERVICAL RADICULOPATHY: ICD-10-CM

## 2021-12-09 DIAGNOSIS — R93.7 ABNORMAL X-RAY OF CERVICAL SPINE: ICD-10-CM

## 2021-12-09 DIAGNOSIS — R51.9 NONINTRACTABLE HEADACHE, UNSPECIFIED CHRONICITY PATTERN, UNSPECIFIED HEADACHE TYPE: ICD-10-CM

## 2021-12-10 NOTE — TELEPHONE ENCOUNTER
Received a message from [facility] but they cannot take third-party insurance.  Will refer again and specify for interventional pain management facility that takes third-party insurance.

## 2022-01-03 ENCOUNTER — HOSPITAL ENCOUNTER (OUTPATIENT)
Dept: RADIOLOGY | Facility: MEDICAL CENTER | Age: 52
End: 2022-01-03
Payer: COMMERCIAL

## 2022-01-05 ENCOUNTER — HOSPITAL ENCOUNTER (OUTPATIENT)
Dept: RADIOLOGY | Facility: MEDICAL CENTER | Age: 52
End: 2022-01-05
Attending: NURSE PRACTITIONER
Payer: COMMERCIAL

## 2022-01-05 ENCOUNTER — TELEMEDICINE2 (OUTPATIENT)
Dept: BEHAVIORAL HEALTH | Facility: CLINIC | Age: 52
End: 2022-01-05
Payer: COMMERCIAL

## 2022-01-05 DIAGNOSIS — R93.7 ABNORMAL X-RAY OF CERVICAL SPINE: ICD-10-CM

## 2022-01-05 DIAGNOSIS — F33.41 RECURRENT MAJOR DEPRESSIVE DISORDER, IN PARTIAL REMISSION (HCC): ICD-10-CM

## 2022-01-05 DIAGNOSIS — M54.12 ACUTE CERVICAL RADICULOPATHY: ICD-10-CM

## 2022-01-05 DIAGNOSIS — F41.9 ANXIETY: ICD-10-CM

## 2022-01-05 DIAGNOSIS — G47.00 INSOMNIA, UNSPECIFIED TYPE: ICD-10-CM

## 2022-01-05 DIAGNOSIS — F41.1 GAD (GENERALIZED ANXIETY DISORDER): ICD-10-CM

## 2022-01-05 PROCEDURE — 90833 PSYTX W PT W E/M 30 MIN: CPT | Mod: 95 | Performed by: PSYCHIATRY & NEUROLOGY

## 2022-01-05 PROCEDURE — 72125 CT NECK SPINE W/O DYE: CPT

## 2022-01-05 PROCEDURE — 99214 OFFICE O/P EST MOD 30 MIN: CPT | Mod: 95 | Performed by: PSYCHIATRY & NEUROLOGY

## 2022-01-05 RX ORDER — DULOXETIN HYDROCHLORIDE 30 MG/1
30 CAPSULE, DELAYED RELEASE ORAL DAILY
Qty: 60 CAPSULE | Refills: 1 | Status: SHIPPED | OUTPATIENT
Start: 2022-01-05 | End: 2022-02-02 | Stop reason: SDUPTHER

## 2022-01-05 RX ORDER — SERTRALINE HYDROCHLORIDE 100 MG/1
TABLET, FILM COATED ORAL
Qty: 90 TABLET | Refills: 0 | Status: SHIPPED | OUTPATIENT
Start: 2022-01-05

## 2022-01-05 RX ORDER — ZOLPIDEM TARTRATE 12.5 MG/1
12.5 TABLET, FILM COATED, EXTENDED RELEASE ORAL NIGHTLY PRN
Qty: 30 TABLET | Refills: 2 | Status: SHIPPED | OUTPATIENT
Start: 2022-01-05 | End: 2022-02-02 | Stop reason: SDUPTHER

## 2022-01-05 ASSESSMENT — PATIENT HEALTH QUESTIONNAIRE - PHQ9
SUM OF ALL RESPONSES TO PHQ QUESTIONS 1-9: 6
CLINICAL INTERPRETATION OF PHQ2 SCORE: 2
5. POOR APPETITE OR OVEREATING: 0 - NOT AT ALL

## 2022-01-06 ENCOUNTER — TELEPHONE (OUTPATIENT)
Dept: MEDICAL GROUP | Facility: PHYSICIAN GROUP | Age: 52
End: 2022-01-06
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
## 2022-01-07 NOTE — TELEPHONE ENCOUNTER
Telephone call to patient to review CT results.  I explained that she has mild multilevel degenerative disc disease and facet degeneration.  She is going to share this with her spine doctor at [facility].  She establish care with [facility] earlier this week and received multiple trigger point injections.  She reports that the injections have helped a great deal with her neck pain.  Her headaches are improving even prior to the injections.  She will be seeing pain specialist again on 1/17/2022.  Does report relatively new symptom of lightheadedness when she laughs or coughs.  Blood pressure has been normal, staying hydrated.  Denies any vision changes, worsening headache.  Discussed further work-up such as MRI of brain.  We have decided to monitor symptoms for now.

## 2022-02-01 ENCOUNTER — TELEPHONE (OUTPATIENT)
Dept: MEDICAL GROUP | Facility: PHYSICIAN GROUP | Age: 52
End: 2022-02-01

## 2022-02-16 ENCOUNTER — TELEPHONE (OUTPATIENT)
Dept: MEDICAL GROUP | Facility: PHYSICIAN GROUP | Age: 52
End: 2022-02-16
Payer: COMMERCIAL

## 2022-02-16 NOTE — TELEPHONE ENCOUNTER
Received telephone call from patient's daughter.  Reports patient committed suicide last Sunday by overdose and hanging.  Offered support to daughter.  She is trying to stay strong for her brother and grandma.
